# Patient Record
Sex: FEMALE | Race: WHITE | NOT HISPANIC OR LATINO | URBAN - METROPOLITAN AREA
[De-identification: names, ages, dates, MRNs, and addresses within clinical notes are randomized per-mention and may not be internally consistent; named-entity substitution may affect disease eponyms.]

---

## 2017-05-12 ENCOUNTER — HOSPITAL ENCOUNTER (INPATIENT)
Facility: MEDICAL CENTER | Age: 32
LOS: 11 days | DRG: 918 | End: 2017-05-24
Attending: EMERGENCY MEDICINE | Admitting: INTERNAL MEDICINE
Payer: COMMERCIAL

## 2017-05-12 DIAGNOSIS — T50.902A DRUG OVERDOSE, INTENTIONAL SELF-HARM, INITIAL ENCOUNTER (HCC): ICD-10-CM

## 2017-05-12 DIAGNOSIS — R45.851 SUICIDAL IDEATION: ICD-10-CM

## 2017-05-12 LAB
ALBUMIN SERPL BCP-MCNC: 4.2 G/DL (ref 3.2–4.9)
ALBUMIN/GLOB SERPL: 2 G/DL
ALP SERPL-CCNC: 44 U/L (ref 30–99)
ALT SERPL-CCNC: 7 U/L (ref 2–50)
AMPHET UR QL SCN: NEGATIVE
ANION GAP SERPL CALC-SCNC: 8 MMOL/L (ref 0–11.9)
APAP SERPL-MCNC: <10 UG/ML (ref 10–30)
AST SERPL-CCNC: 11 U/L (ref 12–45)
BARBITURATES UR QL SCN: NEGATIVE
BASOPHILS # BLD AUTO: 0.4 % (ref 0–1.8)
BASOPHILS # BLD: 0.03 K/UL (ref 0–0.12)
BENZODIAZ UR QL SCN: NEGATIVE
BILIRUB SERPL-MCNC: 0.4 MG/DL (ref 0.1–1.5)
BUN SERPL-MCNC: 13 MG/DL (ref 8–22)
BZE UR QL SCN: NEGATIVE
CALCIUM SERPL-MCNC: 9.1 MG/DL (ref 8.5–10.5)
CANNABINOIDS UR QL SCN: NEGATIVE
CHLORIDE SERPL-SCNC: 107 MMOL/L (ref 96–112)
CO2 SERPL-SCNC: 23 MMOL/L (ref 20–33)
CREAT SERPL-MCNC: 0.73 MG/DL (ref 0.5–1.4)
EOSINOPHIL # BLD AUTO: 0.1 K/UL (ref 0–0.51)
EOSINOPHIL NFR BLD: 1.3 % (ref 0–6.9)
ERYTHROCYTE [DISTWIDTH] IN BLOOD BY AUTOMATED COUNT: 42.9 FL (ref 35.9–50)
ETHANOL BLD-MCNC: 0 G/DL
GFR SERPL CREATININE-BSD FRML MDRD: >60 ML/MIN/1.73 M 2
GLOBULIN SER CALC-MCNC: 2.1 G/DL (ref 1.9–3.5)
GLUCOSE SERPL-MCNC: 89 MG/DL (ref 65–99)
HCT VFR BLD AUTO: 36.5 % (ref 37–47)
HGB BLD-MCNC: 12.1 G/DL (ref 12–16)
IMM GRANULOCYTES # BLD AUTO: 0.02 K/UL (ref 0–0.11)
IMM GRANULOCYTES NFR BLD AUTO: 0.3 % (ref 0–0.9)
LYMPHOCYTES # BLD AUTO: 1.9 K/UL (ref 1–4.8)
LYMPHOCYTES NFR BLD: 25.2 % (ref 22–41)
MCH RBC QN AUTO: 31 PG (ref 27–33)
MCHC RBC AUTO-ENTMCNC: 33.2 G/DL (ref 33.6–35)
MCV RBC AUTO: 93.6 FL (ref 81.4–97.8)
MDMA UR QL SCN: NEGATIVE
METHADONE UR QL SCN: NEGATIVE
MONOCYTES # BLD AUTO: 0.7 K/UL (ref 0–0.85)
MONOCYTES NFR BLD AUTO: 9.3 % (ref 0–13.4)
NEUTROPHILS # BLD AUTO: 4.79 K/UL (ref 2–7.15)
NEUTROPHILS NFR BLD: 63.5 % (ref 44–72)
NRBC # BLD AUTO: 0 K/UL
NRBC BLD AUTO-RTO: 0 /100 WBC
OPIATES UR QL SCN: NEGATIVE
OXYCODONE UR QL SCN: NEGATIVE
PCP UR QL SCN: NEGATIVE
PLATELET # BLD AUTO: 178 K/UL (ref 164–446)
PMV BLD AUTO: 12.6 FL (ref 9–12.9)
POC BREATHALIZER: 0 PERCENT (ref 0–0.01)
POC BREATHALIZER: 0 PERCENT (ref 0–0.01)
POTASSIUM SERPL-SCNC: 3.3 MMOL/L (ref 3.6–5.5)
PROPOXYPH UR QL SCN: NEGATIVE
PROT SERPL-MCNC: 6.3 G/DL (ref 6–8.2)
RBC # BLD AUTO: 3.9 M/UL (ref 4.2–5.4)
SALICYLATES SERPL-MCNC: 0 MG/DL (ref 15–25)
SODIUM SERPL-SCNC: 138 MMOL/L (ref 135–145)
WBC # BLD AUTO: 7.5 K/UL (ref 4.8–10.8)

## 2017-05-12 PROCEDURE — 83735 ASSAY OF MAGNESIUM: CPT

## 2017-05-12 PROCEDURE — 93005 ELECTROCARDIOGRAM TRACING: CPT | Performed by: EMERGENCY MEDICINE

## 2017-05-12 PROCEDURE — 80053 COMPREHEN METABOLIC PANEL: CPT

## 2017-05-12 PROCEDURE — 84100 ASSAY OF PHOSPHORUS: CPT

## 2017-05-12 PROCEDURE — 84703 CHORIONIC GONADOTROPIN ASSAY: CPT

## 2017-05-12 PROCEDURE — 302970 POC BREATHALIZER: Performed by: EMERGENCY MEDICINE

## 2017-05-12 PROCEDURE — 99285 EMERGENCY DEPT VISIT HI MDM: CPT

## 2017-05-12 PROCEDURE — 36415 COLL VENOUS BLD VENIPUNCTURE: CPT

## 2017-05-12 PROCEDURE — 302970 POC BREATHALIZER

## 2017-05-12 PROCEDURE — 85025 COMPLETE CBC W/AUTO DIFF WBC: CPT

## 2017-05-12 PROCEDURE — 96361 HYDRATE IV INFUSION ADD-ON: CPT

## 2017-05-12 PROCEDURE — 80307 DRUG TEST PRSMV CHEM ANLYZR: CPT

## 2017-05-12 PROCEDURE — 700105 HCHG RX REV CODE 258: Performed by: EMERGENCY MEDICINE

## 2017-05-12 RX ORDER — SODIUM CHLORIDE 9 MG/ML
1000 INJECTION, SOLUTION INTRAVENOUS ONCE
Status: COMPLETED | OUTPATIENT
Start: 2017-05-12 | End: 2017-05-13

## 2017-05-12 RX ADMIN — SODIUM CHLORIDE 1000 ML: 9 INJECTION, SOLUTION INTRAVENOUS at 23:28

## 2017-05-12 ASSESSMENT — LIFESTYLE VARIABLES: DO YOU DRINK ALCOHOL: NO

## 2017-05-12 ASSESSMENT — PAIN SCALES - GENERAL: PAINLEVEL_OUTOF10: 0

## 2017-05-12 NOTE — IP AVS SNAPSHOT
" <p align=\"LEFT\"><IMG SRC=\"//[email or web address]/blob$/[email or web address]\" alt=\"Image\" WIDTH=\"50%\" HEIGHT=\"200\" BORDER=\"\"></p>                   Name:Miranda Sykes  Medical Record Number:[id number]  CSN: [id number]    YOB: 1985   Age: 32 y.o.  Sex: female  HT:1.549 m (5' 1\") WT: 56.8 kg (125 lb 3.5 oz)          Admit Date: 5/12/2017     Discharge Date:   Today's Date: 5/24/2017  Attending Doctor:  [facility] [place]*                  Allergies:  Review of patient's allergies indicates no known allergies.          Follow-up Information     1. Follow up with [facility] ([facility]) .    Specialties:  Psychiatry, Behavioral Health    Contact information    [address] [place] [id number] [phone]         Medication List      Take these Medications        Instructions    lamotrigine 200 MG tablet   What changed:    - medication strength  - how much to take   Commonly known as:  LAMICTAL    Take 1 Tab by mouth every day.   Dose:  200 mg       ziprasidone 40 MG Caps   Commonly known as:  GEODON    Take 1 Cap by mouth 2 Times a Day.   Dose:  40 mg         "

## 2017-05-12 NOTE — IP AVS SNAPSHOT
[email or web address] Access Code: [email or web address]  Expires: 6/23/2017  5:37 PM    Your email address is not on file at [facility].  Email Addresses are required for you to sign up for [email or web address], please contact [phone] to verify your personal information and to provide your email address prior to attempting to register for [email or web address].    Miranda Sykes  [address] [place], [place] [id number]    [email or web address]  A secure, online tool to manage your health information     [facility]’s liveMag.ro® is a secure, online tool that connects you to your personalized health information from the privacy of your home -- day or night - making it very easy for you to manage your healthcare. Once the activation process is completed, you can even access your medical information using the [email or web address].ro emmanuel, which is available for free in the Apple Emmanuel store or Google Play store.     To learn more about [email or web address], visit [email or web address]    There are two levels of access available (as shown below):   My Chart Features  [facility] Primary Care Doctor [facility]  Specialists [facility]  Urgent  Care Non-[facility] Primary Care Doctor   Email your healthcare team securely and privately 24/7 X X X    Manage appointments: schedule your next appointment; view details of past/upcoming appointments X      Request prescription refills. X      View recent personal medical records, including lab and immunizations X X X X   View health record, including health history, allergies, medications X X X X   Read reports about your outpatient visits, procedures, consult and ER notes X X X X   See your discharge summary, which is a recap of your hospital and/or ER visit that includes your diagnosis, lab results, and care plan X X  X     How to register for [email or web address]:  Once your e-mail address has been verified, follow the following steps to sign up for [email or web address].     1. Go to  [email or web address]  2. Click on the Sign Up Now box, which takes you to the New Member Sign Up page. You  will need to provide the following information:  a. Enter your [employer] Access Code exactly as it appears at the top of this page. (You will not need to use this code after you’ve completed the sign-up process. If you do not sign up before the expiration date, you must request a new code.)   b. Enter your date of birth.   c. Enter your home email address.   d. Click Submit, and follow the next screen’s instructions.  3. Create a Kuaidi Dachet ID. This will be your [employer] login ID and cannot be changed, so think of one that is secure and easy to remember.  4. Create a [employer] password. You can change your password at any time.  5. Enter your Password Reset Question and Answer. This can be used at a later time if you forget your password.   6. Enter your e-mail address. This allows you to receive e-mail notifications when new information is available in [employer].  7. Click Sign Up. You can now view your health information.    For assistance activating your [employer] account, call [phone]

## 2017-05-12 NOTE — IP AVS SNAPSHOT
" Home Care Instructions                                                                                                                  Name:Miranda Sykes  Medical Record Number:[id number]  CSN: [id number]    YOB: 1985   Age: 32 y.o.  Sex: female  HT:1.549 m (5' 1\") WT: 56.8 kg (125 lb 3.5 oz)          Admit Date: 5/12/2017     Discharge Date:   Today's Date: 5/24/2017  Attending Doctor:  [facility] [place]*                  Allergies:  Review of patient's allergies indicates no known allergies.            Discharge Instructions       Discharge Instructions    Discharged to other by ambulance with escort. Discharged via ambulance, hospital escort: Yes.  Special equipment needed: Not Applicable    Be sure to schedule a follow-up appointment with your primary care doctor or any specialists as instructed.     Discharge Plan:   Smoking Cessation Offered: Patient Counseled  Pneumococcal Vaccine Given - only chart on this line when given: Given (See MAR)  Influenza Vaccine Indication: Not indicated: Previously immunized this influenza season and > 8 years of age    I understand that a diet low in cholesterol, fat, and sodium is recommended for good health. Unless I have been given specific instructions below for another diet, I accept this instruction as my diet prescription.   Other diet: Regular      Special Instructions: None    · Is patient discharged on Warfarin / Coumadin?   No     · Is patient Post Blood Transfusion?  No  Suicidal Feelings: How to Help Yourself  Suicide is the taking of one's own life. If you feel as though life is getting too tough to handle and are thinking about suicide, get help right away. To get help:  · Call your local emergency services (911 in the [place]  · Call a suicide hotline to speak with a trained counselor who understands how you are feeling. The following is a list of suicide hotlines in the [place]. For a list of hotlines in [place], visit " www.suicide.org/hotlines/international/homxne-ganeacr-dfwvkiua.html.  ¨  5-251-280-TALK (1-800.332.9736).  ¨  2-712-LALCTTI (1-805.259.6783).  ¨  1-632.498.3280. This is a hotline for [place] speakers.  ¨  7-885-773-4TTY (1-257.645.6787). This is a hotline for TTY users.  ¨  3-470-3-U-SANTIAGO (1-332.614.4351). This is a hotline for lesbian, ortega, bisexual, transgender, or questioning youth.  · Contact a crisis center or a local suicide prevention center. To find a crisis center or suicide prevention center:  ¨ Call your local hospital, clinic, community service organization, mental health center, social service provider, or health department. Ask for assistance in connecting to a crisis center.  ¨ Visit www.suicidepreventionlifeline.org/getinvolved/ for a list of crisis centers in the [place], or visit www.suicideprevention.ca/xvjehvar-iujhb-immjswk/find-a-crisis-centre for a list of centers in [place].  · Visit the following websites:  ¨  National Suicide Prevention Lifeline: www.suicidepreventionlifeline.org  ¨  Hopeline: www.hopeline.com  ¨  American Foundation for Suicide Prevention: www.afsp.org  ¨  The [place] Project (for lesbian, ortega, bisexual, transgender, or questioning youth): www.thetrevorproject.org  HOW CAN I HELP MYSELF FEEL BETTER?  · Promise yourself that you will not do anything drastic when you have suicidal feelings. Remember, there is hope. Many people have gotten through suicidal thoughts and feelings, and you will, too. You may have gotten through them before, and this proves that you can get through them again.  · Let family, friends, teachers, or counselors know how you are feeling. Try not to isolate yourself from those who care about you. Remember, they will want to help you. Talk with someone every day, even if you do not feel sociable. Face-to-face conversation is best.  · Call a mental health professional and see one regularly.  · Visit your primary health care provider every  year.  · Eat a well-balanced diet, and space your meals so you eat regularly.  · Get plenty of rest.  · Avoid alcohol and drugs, and remove them from your home. They will only make you feel worse.  · If you are thinking of taking a lot of medicine, give your medicine to someone who can give it to you one day at a time. If you are on antidepressants and are concerned you will overdose, let your health care provider know so he or she can give you safer medicines. Ask your mental health professional about the possible side effects of any medicines you are taking.  · Remove weapons, poisons, knives, and anything else that could harm you from your home.  · Try to stick to routines. Follow a schedule every day. Put self-care on your schedule.  · Make a list of realistic goals, and cross them off when you achieve them. Accomplishments give a sense of worth.  · Wait until you are feeling better before doing the things you find difficult or unpleasant.  · Exercise if you are able. You will feel better if you exercise for even a half hour each day.  · Go out in the sun or into nature. This will help you recover from depression faster. If you have a favorite place to walk, go there.  · Do the things that have always given you pleasure. Play your favorite music, read a good book, paint a picture, play your favorite instrument, or do anything else that takes your mind off your depression if it is safe to do.  · Keep your living space well lit.  · When you are feeling well, write yourself a letter about tips and support that you can read when you are not feeling well.  · Remember that life's difficulties can be sorted out with help. Conditions can be treated. You can work on thoughts and strategies that serve you well.     This information is not intended to replace advice given to you by your health care provider. Make sure you discuss any questions you have with your health care provider.     Document Released: 06/23/2004  Document Revised: 01/08/2016 Document Reviewed: 04/14/2015  Cloudwords Interactive Patient Education ©2016 [employer]      Depression / Suicide Risk    As you are discharged from this [facility] facility, it is important to learn how to keep safe from harming yourself.    Recognize the warning signs:  · Abrupt changes in personality, positive or negative- including increase in energy   · Giving away possessions  · Change in eating patterns- significant weight changes-  positive or negative  · Change in sleeping patterns- unable to sleep or sleeping all the time   · Unwillingness or inability to communicate  · Depression  · Unusual sadness, discouragement and loneliness  · Talk of wanting to die  · Neglect of personal appearance   · Rebelliousness- reckless behavior  · Withdrawal from people/activities they love  · Confusion- inability to concentrate     If you or a loved one observes any of these behaviors or has concerns about self-harm, here's what you can do:  · Talk about it- your feelings and reasons for harming yourself  · Remove any means that you might use to hurt yourself (examples: pills, rope, extension cords, firearm)  · Get professional help from the community (Mental Health, Substance Abuse, psychological counseling)  · Do not be alone:Call your Safe Contact- someone whom you trust who will be there for you.  · Call your local CRISIS HOTLINE 983-6761 or 357-240-0812  · Call your local Children's Mobile Crisis Response Team [place] (435) 421-0218 or www.thesixtyone  · Call the toll free National Suicide Prevention Hotlines   · National Suicide Prevention Lifeline 349-309-BCHB (2344)  · National Hope Line Network 800-SUICIDE (358-7501)        Follow-up Information     1. Follow up with [facility] - [facility] ([facility]) .    Specialties:  Psychiatry, Behavioral Health    Contact information    [address] [id number] [phone]         Discharge Medication  Instructions:    Below are the medications your physician expects you to take upon discharge:    Review all your home medications and newly ordered medications with your doctor and/or pharmacist. Follow medication instructions as directed by your doctor and/or pharmacist.    Please keep your medication list with you and share with your physician.               Medication List      START taking these medications        Instructions    Morning Afternoon Evening Bedtime    ziprasidone 40 MG Caps   Last time this was given:  40 mg on 5/24/2017  8:40 AM   Commonly known as:  GEODON        Take 1 Cap by mouth 2 Times a Day.   Dose:  40 mg                          CHANGE how you take these medications        Instructions    Morning Afternoon Evening Bedtime    lamotrigine 200 MG tablet   What changed:    - medication strength  - how much to take   Last time this was given:  200 mg on 5/24/2017  8:40 AM   Commonly known as:  LAMICTAL        Take 1 Tab by mouth every day.   Dose:  200 mg                          STOP taking these medications     risperidone 3 MG Tabs   Commonly known as:  RISPERDAL                    Where to Get Your Medications      Information about where to get these medications is not yet available     ! Ask your nurse or doctor about these medications    - lamotrigine 200 MG tablet  - ziprasidone 40 MG Caps            Instructions           Diet / Nutrition:    Follow any diet instructions given to you by your doctor or the dietician, including how much salt (sodium) you are allowed each day.    If you are overweight, talk to your doctor about a weight reduction plan.    Activity:    Remain physically active following your doctor's instructions about exercise and activity.    Rest often.     Any time you become even a little tired or short of breath, SIT DOWN and rest.    Worsening Symptoms:    Report any of the following signs and symptoms to the doctor's office immediately:    *Pain of jaw, arm, or  neck  *Chest pain not relieved by medication                               *Dizziness or loss of consciousness  *Difficulty breathing even when at rest   *More tired than usual                                       *Bleeding drainage or swelling of surgical site  *Swelling of feet, ankles, legs or stomach                 *Fever (>100ºF)  *Pink or blood tinged sputum  *Weight gain (3lbs/day or 5lbs /week)           *Shock from internal defibrillator (if applicable)  *Palpitations or irregular heartbeats                *Cool and/or numb extremities    Stroke Awareness    Common Risk Factors for Stroke include:    Age  Atrial Fibrillation  Carotid Artery Stenosis  Diabetes Mellitus  Excessive alcohol consumption  High blood pressure  Overweight   Physical inactivity  Smoking    Warning signs and symptoms of a stroke include:    *Sudden numbness or weakness of the face, arm or leg (especially on one side of the body).  *Sudden confusion, trouble speaking or understanding.  *Sudden trouble seeing in one or both eyes.  *Sudden trouble walking, dizziness, loss of balance or coordination.Sudden severe headache with no known cause.    It is very important to get treatment quickly when a stroke occurs. If you experience any of the above warning signs, call 911 immediately.                   Disclaimer         Quit Smoking / Tobacco Use:    I understand the use of any tobacco products increases my chance of suffering from future heart disease or stroke and could cause other illnesses which may shorten my life. Quitting the use of tobacco products is the single most important thing I can do to improve my health. For further information on smoking / tobacco cessation call a Toll Free Quit Line at 1-522.772.9254 (*National Cancer [place]) or 1-500.809.8211 (American Lung Association) or you can access the web based program at www.lungusa.org.    [place] Tobacco Users Help Line:  (243) 526-2013       Toll Free:  5-474-854-8540  Quit Tobacco Program [facility] (329)089-0608    Crisis Hotline:    National Crisis Hotline:  6-453-DKHOXYN or 1-305.725.7582    [place] Crisis Hotline:    1-549.585.8568 or 475-992-7898    Discharge Survey:   Thank you for choosing [facility]. We hope we did everything we could to make your hospital stay a pleasant one. You may be receiving a phone survey and we would appreciate your time and participation in answering the questions. Your input is very valuable to us in our efforts to improve our service to our patients and their families.        My signature on this form indicates that:    1. I have reviewed and understand the above information.  2. My questions regarding this information have been answered to my satisfaction.  3. I have formulated a plan with my discharge nurse to obtain my prescribed medications for home.                  Disclaimer         __________________________________                     __________       ________                       Patient Signature                                                 Date                    Time

## 2017-05-12 NOTE — IP AVS SNAPSHOT
5/24/2017    Miranda Sykes  [address] [address]    Dear Miranda:    Psychiatric hospital wants to ensure your discharge home is safe and you or your loved ones have had all of your questions answered regarding your care after you leave the hospital.    Below is a list of resources and contact information should you have any questions regarding your hospital stay, follow-up instructions, or active medical symptoms.    Questions or Concerns Regarding… Contact   Medical Questions Related to Your Discharge  (7 days a week, 8am-5pm) Contact a Nurse Care Coordinator   [phone]   Medical Questions Not Related to Your Discharge  (24 hours a day / 7 days a week)  Contact the Nurse Health Line   [phone]    Medications or Discharge Instructions Refer to your discharge packet   or contact your [facility] Primary Care Provider   [phone]   Follow-up Appointment(s) Schedule your appointment via [facility]   or contact Scheduling [phone]   Billing Review your statement via [employer]  or contact Billing [phone]   Medical Records Review your records via Sansan   or contact Medical Records [phone]     You may receive a telephone call within two days of discharge. This call is to make certain you understand your discharge instructions and have the opportunity to have any questions answered. You can also easily access your medical information, test results and upcoming appointments via the [employer] free online health management tool. You can learn more and sign up at [email or web address]. For assistance setting up your [employer] account, please call [phone].    Once again, we want to ensure your discharge home is safe and that you have a clear understanding of any next steps in your care. If you have any questions or concerns, please do not hesitate to contact us, we are here for you. Thank you for choosing [facility] for your healthcare needs.    Sincerely,    Your [facility] Healthcare Team

## 2017-05-13 ENCOUNTER — RESOLUTE PROFESSIONAL BILLING HOSPITAL PROF FEE (OUTPATIENT)
Dept: OTHER | Facility: MEDICAL CENTER | Age: 32
End: 2017-05-13
Payer: COMMERCIAL

## 2017-05-13 ENCOUNTER — APPOINTMENT (OUTPATIENT)
Dept: RADIOLOGY | Facility: MEDICAL CENTER | Age: 32
DRG: 918 | End: 2017-05-13
Attending: HOSPITALIST
Payer: COMMERCIAL

## 2017-05-13 PROBLEM — T50.902A DRUG OVERDOSE, INTENTIONAL (HCC): Status: ACTIVE | Noted: 2017-05-13

## 2017-05-13 PROBLEM — I95.9 HYPOTENSION: Status: ACTIVE | Noted: 2017-05-13

## 2017-05-13 LAB
ALBUMIN SERPL BCP-MCNC: 3.3 G/DL (ref 3.2–4.9)
ALBUMIN/GLOB SERPL: 1.8 G/DL
ALP SERPL-CCNC: 35 U/L (ref 30–99)
ALT SERPL-CCNC: 6 U/L (ref 2–50)
ANION GAP SERPL CALC-SCNC: 4 MMOL/L (ref 0–11.9)
ANION GAP SERPL CALC-SCNC: 6 MMOL/L (ref 0–11.9)
AST SERPL-CCNC: 10 U/L (ref 12–45)
BASOPHILS # BLD AUTO: 0.3 % (ref 0–1.8)
BASOPHILS # BLD: 0.02 K/UL (ref 0–0.12)
BILIRUB SERPL-MCNC: 0.4 MG/DL (ref 0.1–1.5)
BUN SERPL-MCNC: 8 MG/DL (ref 8–22)
BUN SERPL-MCNC: 8 MG/DL (ref 8–22)
CALCIUM SERPL-MCNC: 8.2 MG/DL (ref 8.5–10.5)
CALCIUM SERPL-MCNC: 8.7 MG/DL (ref 8.5–10.5)
CHLORIDE SERPL-SCNC: 113 MMOL/L (ref 96–112)
CHLORIDE SERPL-SCNC: 115 MMOL/L (ref 96–112)
CO2 SERPL-SCNC: 21 MMOL/L (ref 20–33)
CO2 SERPL-SCNC: 24 MMOL/L (ref 20–33)
CREAT SERPL-MCNC: 0.55 MG/DL (ref 0.5–1.4)
CREAT SERPL-MCNC: 0.71 MG/DL (ref 0.5–1.4)
EKG IMPRESSION: NORMAL
EKG IMPRESSION: NORMAL
EOSINOPHIL # BLD AUTO: 0.09 K/UL (ref 0–0.51)
EOSINOPHIL NFR BLD: 1.1 % (ref 0–6.9)
ERYTHROCYTE [DISTWIDTH] IN BLOOD BY AUTOMATED COUNT: 43.6 FL (ref 35.9–50)
GFR SERPL CREATININE-BSD FRML MDRD: >60 ML/MIN/1.73 M 2
GFR SERPL CREATININE-BSD FRML MDRD: >60 ML/MIN/1.73 M 2
GLOBULIN SER CALC-MCNC: 1.8 G/DL (ref 1.9–3.5)
GLUCOSE SERPL-MCNC: 127 MG/DL (ref 65–99)
GLUCOSE SERPL-MCNC: 92 MG/DL (ref 65–99)
HCG SERPL QL: NEGATIVE
HCT VFR BLD AUTO: 32.9 % (ref 37–47)
HGB BLD-MCNC: 10.8 G/DL (ref 12–16)
IMM GRANULOCYTES # BLD AUTO: 0.02 K/UL (ref 0–0.11)
IMM GRANULOCYTES NFR BLD AUTO: 0.3 % (ref 0–0.9)
LYMPHOCYTES # BLD AUTO: 1.92 K/UL (ref 1–4.8)
LYMPHOCYTES NFR BLD: 24.5 % (ref 22–41)
MAGNESIUM SERPL-MCNC: 1.8 MG/DL (ref 1.5–2.5)
MAGNESIUM SERPL-MCNC: 1.9 MG/DL (ref 1.5–2.5)
MCH RBC QN AUTO: 31 PG (ref 27–33)
MCHC RBC AUTO-ENTMCNC: 32.8 G/DL (ref 33.6–35)
MCV RBC AUTO: 94.5 FL (ref 81.4–97.8)
MONOCYTES # BLD AUTO: 0.53 K/UL (ref 0–0.85)
MONOCYTES NFR BLD AUTO: 6.8 % (ref 0–13.4)
NEUTROPHILS # BLD AUTO: 5.26 K/UL (ref 2–7.15)
NEUTROPHILS NFR BLD: 67 % (ref 44–72)
NRBC # BLD AUTO: 0 K/UL
NRBC BLD AUTO-RTO: 0 /100 WBC
PHOSPHATE SERPL-MCNC: 3.1 MG/DL (ref 2.5–4.5)
PHOSPHATE SERPL-MCNC: 3.3 MG/DL (ref 2.5–4.5)
PLATELET # BLD AUTO: 148 K/UL (ref 164–446)
PMV BLD AUTO: 12.3 FL (ref 9–12.9)
POTASSIUM SERPL-SCNC: 3.9 MMOL/L (ref 3.6–5.5)
POTASSIUM SERPL-SCNC: 4.7 MMOL/L (ref 3.6–5.5)
PROT SERPL-MCNC: 5.1 G/DL (ref 6–8.2)
RBC # BLD AUTO: 3.48 M/UL (ref 4.2–5.4)
SODIUM SERPL-SCNC: 140 MMOL/L (ref 135–145)
SODIUM SERPL-SCNC: 143 MMOL/L (ref 135–145)
WBC # BLD AUTO: 7.8 K/UL (ref 4.8–10.8)

## 2017-05-13 PROCEDURE — 80053 COMPREHEN METABOLIC PANEL: CPT

## 2017-05-13 PROCEDURE — 83735 ASSAY OF MAGNESIUM: CPT

## 2017-05-13 PROCEDURE — 96372 THER/PROPH/DIAG INJ SC/IM: CPT

## 2017-05-13 PROCEDURE — 84100 ASSAY OF PHOSPHORUS: CPT

## 2017-05-13 PROCEDURE — 700111 HCHG RX REV CODE 636 W/ 250 OVERRIDE (IP): Performed by: STUDENT IN AN ORGANIZED HEALTH CARE EDUCATION/TRAINING PROGRAM

## 2017-05-13 PROCEDURE — 700111 HCHG RX REV CODE 636 W/ 250 OVERRIDE (IP): Performed by: INTERNAL MEDICINE

## 2017-05-13 PROCEDURE — 700101 HCHG RX REV CODE 250: Performed by: STUDENT IN AN ORGANIZED HEALTH CARE EDUCATION/TRAINING PROGRAM

## 2017-05-13 PROCEDURE — 93005 ELECTROCARDIOGRAM TRACING: CPT | Performed by: HOSPITALIST

## 2017-05-13 PROCEDURE — 96361 HYDRATE IV INFUSION ADD-ON: CPT

## 2017-05-13 PROCEDURE — 36415 COLL VENOUS BLD VENIPUNCTURE: CPT

## 2017-05-13 PROCEDURE — 85025 COMPLETE CBC W/AUTO DIFF WBC: CPT

## 2017-05-13 PROCEDURE — 96366 THER/PROPH/DIAG IV INF ADDON: CPT

## 2017-05-13 PROCEDURE — 700102 HCHG RX REV CODE 250 W/ 637 OVERRIDE(OP): Performed by: STUDENT IN AN ORGANIZED HEALTH CARE EDUCATION/TRAINING PROGRAM

## 2017-05-13 PROCEDURE — A9270 NON-COVERED ITEM OR SERVICE: HCPCS | Performed by: STUDENT IN AN ORGANIZED HEALTH CARE EDUCATION/TRAINING PROGRAM

## 2017-05-13 PROCEDURE — 700105 HCHG RX REV CODE 258: Performed by: HOSPITALIST

## 2017-05-13 PROCEDURE — 99291 CRITICAL CARE FIRST HOUR: CPT | Mod: GC | Performed by: INTERNAL MEDICINE

## 2017-05-13 PROCEDURE — 96365 THER/PROPH/DIAG IV INF INIT: CPT

## 2017-05-13 PROCEDURE — 700102 HCHG RX REV CODE 250 W/ 637 OVERRIDE(OP): Performed by: PSYCHIATRY & NEUROLOGY

## 2017-05-13 PROCEDURE — A9270 NON-COVERED ITEM OR SERVICE: HCPCS | Performed by: PSYCHIATRY & NEUROLOGY

## 2017-05-13 PROCEDURE — 96367 TX/PROPH/DG ADDL SEQ IV INF: CPT

## 2017-05-13 PROCEDURE — 80048 BASIC METABOLIC PNL TOTAL CA: CPT

## 2017-05-13 PROCEDURE — 770006 HCHG ROOM/CARE - MED/SURG/GYN SEMI*

## 2017-05-13 PROCEDURE — 700105 HCHG RX REV CODE 258: Performed by: INTERNAL MEDICINE

## 2017-05-13 PROCEDURE — 93005 ELECTROCARDIOGRAM TRACING: CPT | Performed by: STUDENT IN AN ORGANIZED HEALTH CARE EDUCATION/TRAINING PROGRAM

## 2017-05-13 PROCEDURE — 71010 DX-CHEST-PORTABLE (1 VIEW): CPT

## 2017-05-13 RX ORDER — LAMOTRIGINE 100 MG/1
200 TABLET ORAL DAILY
Status: DISCONTINUED | OUTPATIENT
Start: 2017-05-13 | End: 2017-05-24 | Stop reason: HOSPADM

## 2017-05-13 RX ORDER — MAGNESIUM SULFATE HEPTAHYDRATE 40 MG/ML
2 INJECTION, SOLUTION INTRAVENOUS ONCE
Status: COMPLETED | OUTPATIENT
Start: 2017-05-13 | End: 2017-05-13

## 2017-05-13 RX ORDER — POLYETHYLENE GLYCOL 3350 17 G/17G
1 POWDER, FOR SOLUTION ORAL
Status: DISCONTINUED | OUTPATIENT
Start: 2017-05-13 | End: 2017-05-24

## 2017-05-13 RX ORDER — LAMOTRIGINE 100 MG/1
200 TABLET ORAL DAILY
Status: DISCONTINUED | OUTPATIENT
Start: 2017-05-13 | End: 2017-05-13

## 2017-05-13 RX ORDER — LAMOTRIGINE 25 MG/1
25 TABLET ORAL DAILY
Status: ON HOLD | COMMUNITY
End: 2017-05-24

## 2017-05-13 RX ORDER — ENALAPRILAT 1.25 MG/ML
1.25 INJECTION INTRAVENOUS EVERY 6 HOURS PRN
Status: DISCONTINUED | OUTPATIENT
Start: 2017-05-13 | End: 2017-05-13

## 2017-05-13 RX ORDER — LORAZEPAM 1 MG/1
1 TABLET ORAL
Status: DISCONTINUED | OUTPATIENT
Start: 2017-05-13 | End: 2017-05-13

## 2017-05-13 RX ORDER — SODIUM CHLORIDE AND POTASSIUM CHLORIDE 300; 900 MG/100ML; MG/100ML
INJECTION, SOLUTION INTRAVENOUS CONTINUOUS
Status: DISCONTINUED | OUTPATIENT
Start: 2017-05-13 | End: 2017-05-13

## 2017-05-13 RX ORDER — LAMOTRIGINE 100 MG/1
100 TABLET ORAL ONCE
Status: ACTIVE | OUTPATIENT
Start: 2017-05-13 | End: 2017-05-14

## 2017-05-13 RX ORDER — SODIUM CHLORIDE 9 MG/ML
INJECTION, SOLUTION INTRAVENOUS CONTINUOUS
Status: DISCONTINUED | OUTPATIENT
Start: 2017-05-13 | End: 2017-05-14

## 2017-05-13 RX ORDER — LORAZEPAM 1 MG/1
1 TABLET ORAL EVERY 4 HOURS PRN
Status: DISCONTINUED | OUTPATIENT
Start: 2017-05-13 | End: 2017-05-24 | Stop reason: HOSPADM

## 2017-05-13 RX ORDER — SODIUM CHLORIDE 9 MG/ML
1000 INJECTION, SOLUTION INTRAVENOUS ONCE
Status: COMPLETED | OUTPATIENT
Start: 2017-05-13 | End: 2017-05-13

## 2017-05-13 RX ORDER — ACETAMINOPHEN 325 MG/1
650 TABLET ORAL EVERY 6 HOURS PRN
Status: DISCONTINUED | OUTPATIENT
Start: 2017-05-13 | End: 2017-05-24 | Stop reason: HOSPADM

## 2017-05-13 RX ORDER — RISPERIDONE 3 MG/1
3 TABLET ORAL 2 TIMES DAILY
Status: ON HOLD | COMMUNITY
End: 2017-05-24

## 2017-05-13 RX ORDER — NICOTINE 21 MG/24HR
21 PATCH, TRANSDERMAL 24 HOURS TRANSDERMAL
Status: DISCONTINUED | OUTPATIENT
Start: 2017-05-13 | End: 2017-05-24 | Stop reason: HOSPADM

## 2017-05-13 RX ORDER — AMOXICILLIN 250 MG
2 CAPSULE ORAL 2 TIMES DAILY
Status: DISCONTINUED | OUTPATIENT
Start: 2017-05-13 | End: 2017-05-24 | Stop reason: HOSPADM

## 2017-05-13 RX ORDER — BISACODYL 10 MG
10 SUPPOSITORY, RECTAL RECTAL
Status: DISCONTINUED | OUTPATIENT
Start: 2017-05-13 | End: 2017-05-24

## 2017-05-13 RX ORDER — POTASSIUM CHLORIDE 1.5 G/1.58G
40 POWDER, FOR SOLUTION ORAL ONCE
Status: COMPLETED | OUTPATIENT
Start: 2017-05-13 | End: 2017-05-13

## 2017-05-13 RX ADMIN — MAGNESIUM SULFATE IN WATER 2 G: 40 INJECTION, SOLUTION INTRAVENOUS at 04:17

## 2017-05-13 RX ADMIN — LAMOTRIGINE 200 MG: 100 TABLET ORAL at 15:15

## 2017-05-13 RX ADMIN — SODIUM CHLORIDE: 9 INJECTION, SOLUTION INTRAVENOUS at 18:19

## 2017-05-13 RX ADMIN — SODIUM CHLORIDE AND POTASSIUM CHLORIDE: 9; 2.98 INJECTION, SOLUTION INTRAVENOUS at 14:22

## 2017-05-13 RX ADMIN — ENOXAPARIN SODIUM 40 MG: 100 INJECTION SUBCUTANEOUS at 08:47

## 2017-05-13 RX ADMIN — STANDARDIZED SENNA CONCENTRATE AND DOCUSATE SODIUM 2 TABLET: 8.6; 5 TABLET, FILM COATED ORAL at 08:47

## 2017-05-13 RX ADMIN — SODIUM CHLORIDE 1000 ML: 9 INJECTION, SOLUTION INTRAVENOUS at 02:18

## 2017-05-13 RX ADMIN — POTASSIUM CHLORIDE 40 MEQ: 1.5 POWDER, FOR SOLUTION ORAL at 02:30

## 2017-05-13 RX ADMIN — NICOTINE 21 MG: 21 PATCH, EXTENDED RELEASE TRANSDERMAL at 08:51

## 2017-05-13 RX ADMIN — SODIUM CHLORIDE 1000 ML: 9 INJECTION, SOLUTION INTRAVENOUS at 00:45

## 2017-05-13 ASSESSMENT — PAIN SCALES - GENERAL
PAINLEVEL_OUTOF10: 0

## 2017-05-13 ASSESSMENT — LIFESTYLE VARIABLES
ALCOHOL_USE: NO
SUBSTANCE_ABUSE: 1
EVER_SMOKED: YES

## 2017-05-13 ASSESSMENT — ENCOUNTER SYMPTOMS: PALPITATIONS: 0

## 2017-05-13 NOTE — H&P
"       [facility] Medicine Admitting History and Physical    Name Miranda Sykes       1985   Age/Sex 32 y.o. female   MRN [id number]   Code Status FULL     After 5PM or if no immediate response to page, please call for cross-coverage  Attending/Team: Selwyn Call [phone] to page   1st Call - Day Intern (R1):    2nd Call - Day Sr. Resident (R2/R3):          Chief Complaint:  Drug overdose with suicidal intent    HPI:  This is a 32-year-old female who presents to the ED with drug overdose. She was brought in after being found by her on after ingesting about 26 pills of 3 mg risperidone. She states that she wanted to kill herself because she thought \"the world and ended \" and that she thought that all of her family members have . She is unable to explain why she thought that all of her family members have  stating only that \"[she has] an extensive psych history\". She states that she is seen by a psychiatrist in [place] where she lives, has only been in [place] for 3 days.    Denies current suicidal ideation now that she knows that her family is alive. States after she took the pills she lie down to sleep at some point her aunt found her and called for help. Has attempted to harm herself 5-6 times in the past both with pills and with cutting. Denies any medical problems other than psych.    Denies any medications other than risperidone (though a bottle of lamotrigine accompanied her to EGD). Apparently normally does well on Risperdal; has only been taking sporadically lately; tends to stop taking her medication when she started smoking marijuana. States that she started smoking marijuana and stop taking her medication recently.    Current smoker (one pack per day?), Smoking since age of 19; denies alcohol use; initially denied recreational drug use, later endorsed marijuana use.    Contacted poison control who recommended monitoring electrolytes and optimizing for prevention of arrhythmia, " "monitoring EKG particularly for QTC prolongation (currently already prolonged at 523), IV fluids, protecting airway if becomes necessary, benzos for agitation, and observing for minimum of 6-8 hours. Case number [id number].    Review of Systems   Unable to perform ROS  Eyes:        No vision changes   Cardiovascular: Positive for leg swelling. Negative for chest pain and palpitations.   Psychiatric/Behavioral: Positive for suicidal ideas and substance abuse.        \"extensive psych history\"   Review of systems Limited due to patient mental status          Past Medical History:   No past medical history on file.    Past Surgical History:  No past surgical history on file.    Current Outpatient Medications:  Home Medications     Reviewed by Mckayla Barrios, Pharmacy Int (Pharmacy Intern) on 05/13/17 at 0001  Med List Status: Complete    Medication Last Dose Status    lamotrigine (LAMICTAL) 25 MG Tab 5/12/2017 Active    risperidone (RISPERDAL) 3 MG Tab 5/13/2017 Active              Medication Allergy/Sensitivities:  No Known Allergies    Family History:  No family history on file.    Social History:  Social History     Social History   • Marital Status: Single     Spouse Name: N/A   • Number of Children: N/A   • Years of Education: N/A     Occupational History   • Not on file.     Social History Main Topics   • Smoking status: Not on file   • Smokeless tobacco: Not on file   • Alcohol Use: Not on file   • Drug Use: Not on file   • Sexual Activity: Not on file     Other Topics Concern   • Not on file     Social History Narrative   • No narrative on file     Living situation: Unknown  PCP : Unknown    Physical Exam     Filed Vitals:    05/12/17 2230 05/12/17 2300 05/12/17 2330 05/12/17 2337   BP:       Pulse: 60 68 57 67   Temp:       Resp: 15 30 16 23   Height:       Weight:       SpO2: 98% 95% 96% 96%     Body mass index is 25.52 kg/(m^2).  BP 96/51 mmHg  Pulse 67  Temp(Src) 36.6 °C (97.9 °F)  Resp 23  Ht 1.549 m (5' " "1\")  Wt 61.236 kg (135 lb)  BMI 25.52 kg/m2  SpO2 96%  O2 therapy: Pulse Oximetry: 96 %, O2 Delivery: None (Room Air)    Physical Exam   Constitutional: She is oriented to person, place, and time and well-developed, well-nourished, and in no distress. No distress.   Very sleepy but arousable   HENT:   Head: Normocephalic and atraumatic.   Nose: Nose normal.   Mouth/Throat: Oropharynx is clear and moist.   Eyes: Conjunctivae are normal. Pupils are equal, round, and reactive to light. No scleral icterus.   Neck: Normal range of motion. No JVD present. No tracheal deviation present. No thyromegaly present.   Cardiovascular: Normal rate, regular rhythm and normal heart sounds.    Pulmonary/Chest: Effort normal and breath sounds normal. No respiratory distress. She has no wheezes. She has no rales.   Abdominal: Soft. Bowel sounds are normal. She exhibits no distension and no mass. There is no tenderness. There is no guarding.   Musculoskeletal: She exhibits edema. She exhibits no tenderness.   Bilateral lower extremity 1+ nonpitting edema   Lymphadenopathy:     She has no cervical adenopathy.   Neurological: She is oriented to person, place, and time. No cranial nerve deficit. She exhibits normal muscle tone. GCS score is 15.   Sleepy but arousable  Gait not tested  No gross focal sensory or motor deficit   Skin: Skin is warm and dry. No rash noted. She is not diaphoretic. No erythema.       Data Review       Old Records Request:   Deferred  Current Records review and summary: Completed    Lab Data Review:  Recent Results (from the past 24 hour(s))   URINE DRUG SCREEN (TRIAGE)    Collection Time: 05/12/17 10:35 PM   Result Value Ref Range    Amphetamines Urine Negative Negative    Barbiturates Negative Negative    Benzodiazepines Negative Negative    Cocaine Metabolite Negative Negative    Methadone Negative Negative    Ecstasy Negative Negative    Opiates Negative Negative    Oxycodone Negative Negative    " Phencyclidine -Pcp Negative Negative    Propoxyphene Negative Negative    Cannabinoid Metab Negative Negative   CBC WITH DIFFERENTIAL    Collection Time: 05/12/17 10:35 PM   Result Value Ref Range    WBC 7.5 4.8 - 10.8 K/uL    RBC 3.90 (L) 4.20 - 5.40 M/uL    Hemoglobin 12.1 12.0 - 16.0 g/dL    Hematocrit 36.5 (L) 37.0 - 47.0 %    MCV 93.6 81.4 - 97.8 fL    MCH 31.0 27.0 - 33.0 pg    MCHC 33.2 (L) 33.6 - 35.0 g/dL    RDW 42.9 35.9 - 50.0 fL    Platelet Count 178 164 - 446 K/uL    MPV 12.6 9.0 - 12.9 fL    Neutrophils-Polys 63.50 44.00 - 72.00 %    Lymphocytes 25.20 22.00 - 41.00 %    Monocytes 9.30 0.00 - 13.40 %    Eosinophils 1.30 0.00 - 6.90 %    Basophils 0.40 0.00 - 1.80 %    Immature Granulocytes 0.30 0.00 - 0.90 %    Nucleated RBC 0.00 /100 WBC    Neutrophils (Absolute) 4.79 2.00 - 7.15 K/uL    Lymphs (Absolute) 1.90 1.00 - 4.80 K/uL    Monos (Absolute) 0.70 0.00 - 0.85 K/uL    Eos (Absolute) 0.10 0.00 - 0.51 K/uL    Baso (Absolute) 0.03 0.00 - 0.12 K/uL    Immature Granulocytes (abs) 0.02 0.00 - 0.11 K/uL    NRBC (Absolute) 0.00 K/uL   COMP METABOLIC PANEL    Collection Time: 05/12/17 10:35 PM   Result Value Ref Range    Sodium 138 135 - 145 mmol/L    Potassium 3.3 (L) 3.6 - 5.5 mmol/L    Chloride 107 96 - 112 mmol/L    Co2 23 20 - 33 mmol/L    Anion Gap 8.0 0.0 - 11.9    Glucose 89 65 - 99 mg/dL    Bun 13 8 - 22 mg/dL    Creatinine 0.73 0.50 - 1.40 mg/dL    Calcium 9.1 8.5 - 10.5 mg/dL    AST(SGOT) 11 (L) 12 - 45 U/L    ALT(SGPT) 7 2 - 50 U/L    Alkaline Phosphatase 44 30 - 99 U/L    Total Bilirubin 0.4 0.1 - 1.5 mg/dL    Albumin 4.2 3.2 - 4.9 g/dL    Total Protein 6.3 6.0 - 8.2 g/dL    Globulin 2.1 1.9 - 3.5 g/dL    A-G Ratio 2.0 g/dL   Blood Alcohol    Collection Time: 05/12/17 10:35 PM   Result Value Ref Range    Diagnostic Alcohol 0.00 0.00 g/dL   Acetaminophen Level    Collection Time: 05/12/17 10:35 PM   Result Value Ref Range    Acetaminophen -Tylenol <10 10 - 30 ug/mL   SALICYLATE LEVEL     Collection Time: 17 10:35 PM   Result Value Ref Range    Salicylates, Quant. 0 (L) 15 - 25 mg/dL   ESTIMATED GFR    Collection Time: 17 10:35 PM   Result Value Ref Range    GFR If African American >60 >60 mL/min/1.73 m 2    GFR If Non African American >60 >60 mL/min/1.73 m 2   POC BREATHALIZER    Collection Time: 17 10:37 PM   Result Value Ref Range    POC Breathalizer 0.00 0.00 - 0.01 Percent   EKG (NOW)    Collection Time: 17 11:24 PM   Result Value Ref Range    Report       [facility] Emergency Dept.    Test Date:  2017  Pt Name:    TAWANDA MAC               Department: ER  MRN:        [id number]                      Room:       [facility]  Gender:     F                            Technician: [id number]  :        1985                   Requested By:SHILO HODGE  Order #:    [id number]                    Reading MD:    Measurements  Intervals                                Axis  Rate:       70                           P:          55  LA:         128                          QRS:        63  QRSD:       84                           T:          16  QT:         484  QTc:        523    Interpretive Statements  SINUS RHYTHM  BORDERLINE T ABNORMALITIES, INFERIOR LEADS  PROLONGED QT INTERVAL  No previous ECG available for comparison     POC BREATHALIZER    Collection Time: 17 11:58 PM   Result Value Ref Range    POC Breathalizer 0.0 0.00 - 0.01 Percent       Imaging/Procedures Review:    ndependant Imaging Review: none  No orders to display      EKG:   EKG Independant Review: Completed  QTc:523  HR: 70  QRS: 84  LA: 128  Sinus rhythm  T-wave abnormalities in leads II, III, and aVF  Prolonged QTC    Records reviewed and summarized in current documentation       Assessment/Plan   # Drug overdose with suicidal intent  - Patient ingested about 26 tablets or 3 mg risperidone at 1930 this evening  - Patient states she had suicidal intent wanting to harm herself because  "she thought \"the world had ended\". History of multiple suicide attempts in the past using of drug overdose and cutting.  - She states that she has an extensive psych history although was not able to elaborate at this point in time, she does presents with prescription bottles for both risperidone and Lamictal.  - QTC prolonged at 523 and potassium low at 3.3; repleting with 40 mEq KCl PO and starting IV fluids with 40 mEq KCl per 1L at 150 mL/hr after IV bolus  - UDS negative; pregnancy test pending  Plan:  - We will hold initiated by the ED, will continue  - Inpatient suicide protocol  - Consulted psychiatry, the team to contact when patient alert enough to participate in  - Monitor K, mag, phosphorus and replete as necessary  - Monitor EKG particularly for QTC  - Telemetry monitoring, continuous pulse ox, q4hr neuro checks  - Continue IV fluids; already received 1 L IV fluid bolus and have ordered a second, will continue at 150 mils per hour after  - Lorazepam PRN for agitation  - Aspiration, seizure, and fall precautions  - Will hold outpatient medications of risperidone and Lamictal due to current overdose    # Tobacco use  - Nicotine replacement  - Tobacco cessation education and counseling when appropriate    Anticipated Hospital stay:  >2 midnights    Quality Measures  EKG reviewed, Labs reviewed and Medications reviewed  Duncan catheter: No Duncan      DVT Prophylaxis: Enoxaparin (Lovenox)    Ulcer prophylaxis: Not indicated            Addendum 0530:  Patient became more hypotensive in ED despite IVFs. Was evaluated by critical care who agree to admit to ICU.       "

## 2017-05-13 NOTE — PROGRESS NOTES
Pt arrived to S621. Pt A&Ox4. Admit profile completed. All cords removed. Safety education provided, pt verbalizes understanding. Pt on legal hold and 1:1 obs. Sitter at bedside.

## 2017-05-13 NOTE — PROGRESS NOTES
[employer] Gold Team Attending Note  (see full Resident note dictated in EPIC)    Date of Service 5/13/2017    Assessment:  - SI/SA - took 26-3mg risperidone tabs, utox (-),   - qtc 523  - Hypokalemia  - marginal hypotension  - Hx of multiple suicide attempts   - + tob use    Plan:  - q4 ecg until qtc normalizes  - prn bolus  - may need bicarb  - legal hold and psych eval  - replace k      I have seen and examined the patient today.  I have reviewed the medical record, laboratory data, imaging and all relevant studies.  I have discussed the plan of care with the Internal Medicine Resident and agree with the note and plan as documented.       Total critical care time, not including billable procedures 35 minutes.

## 2017-05-13 NOTE — ED NOTES
"Chief Complaint   Patient presents with   • Drug Overdose   • Suicidal Ideation     BP 96/51 mmHg  Pulse 64  Temp(Src) 36.6 °C (97.9 °F)  Resp 35  Ht 1.549 m (5' 1\")  Wt 61.236 kg (135 lb)  BMI 25.52 kg/m2  SpO2 98%    Pt brought in by REILLY from [facility], pt reports SI states \"I thought the world was going to end so I wanted to kill myself.\" Pt also reports hearing voices that are telling her to harm herself. Denies HI. Pt admits to taking 78 mg of Risperdal. Reporting feeling tired and dizzy. Urine sample collected and sent to lab. SAD score 6.  pta. Placed in room RD. Complaints and vitals as above. Pt on monitors, all alarms audible. Chart flagged for ERP to see.  "

## 2017-05-13 NOTE — ED PROVIDER NOTES
"ED Provider Note    Scribed for Dr. Benny Bravo M.D. by Korey Olmstead. 5/12/2017  11:03 PM    Primary care provider: None noted  Means of arrival: Walk in  History obtained from: Patient  History limited by: None    CHIEF COMPLAINT  Chief Complaint   Patient presents with   • Drug Overdose   • Suicidal Ideation       HPI  Miranda Sykes is a 32 y.o. female who presents to the Emergency Department for evaluation after overdosing on Risperdal which she took at approximately 7:30 PM today. Per nursing note, patient was at the [facility] earlier today when she experienced associated suicidal ideations. Patient states she took a total of 78 mg Risperdal because she \"wanted to kill myself.\" She also notes associated increased hunger, tiredness and dizziness exacerbated when standing up. She does not report any recent fevers. Patient denies any history of diabetes or hypertension. She states low blood pressure runs in her family.    REVIEW OF SYSTEMS  Pertinent positives include suicidal ideations, tiredness, increased hunger, dizziness. Pertinent negatives include no fevers. As above, all other systems reviewed and are negative.   See HPI for further details.   C    PAST MEDICAL HISTORY   None noted    SURGICAL HISTORY  patient denies any surgical history    SOCIAL HISTORY  None noted    FAMILY HISTORY  Hypotension    CURRENT MEDICATIONS  No current facility-administered medications on file prior to encounter.     No current outpatient prescriptions on file prior to encounter.      ALLERGIES  No Known Allergies    PHYSICAL EXAM  VITAL SIGNS: BP 96/51 mmHg  Pulse 60  Temp(Src) 36.6 °C (97.9 °F)  Resp 15  Ht 1.549 m (5' 1\")  Wt 61.236 kg (135 lb)  BMI 25.52 kg/m2  SpO2 98%  Constitutional: Well developed, Well nourished, no distress, Non-toxic appearance. Sleepy but arouseable, answer questions, oriented  HENT: Normocephalic, Atraumatic, Bilateral external ears normal, Oropharynx moist, No oral exudates. "   Eyes: PERRLA, EOMI, Conjunctiva normal, No discharge.   Neck: No tenderness, Supple, No stridor.   Lymphatic: No lymphadenopathy noted.   Cardiovascular: Normal heart rate, Normal rhythm.   Thorax & Lungs: Clear to auscultation bilaterally, No respiratory distress, No wheezing, No crackles.   Abdomen: Soft, No tenderness, No masses, No pulsatile masses.   Skin: Warm, Dry, No erythema, No rash.   Extremities:, No edema No cyanosis.   Musculoskeletal: No tenderness to palpation or major deformities noted.  Intact distal pulses  Neurologic: Moves all extremities spontaneously. Sleepy but arouseable, answer questions, oriented  Psychiatric: Flat affect.     LABS  Results for orders placed or performed during the hospital encounter of 05/12/17   URINE DRUG SCREEN (TRIAGE)   Result Value Ref Range    Amphetamines Urine Negative Negative    Barbiturates Negative Negative    Benzodiazepines Negative Negative    Cocaine Metabolite Negative Negative    Methadone Negative Negative    Ecstasy Negative Negative    Opiates Negative Negative    Oxycodone Negative Negative    Phencyclidine -Pcp Negative Negative    Propoxyphene Negative Negative    Cannabinoid Metab Negative Negative   CBC WITH DIFFERENTIAL   Result Value Ref Range    WBC 7.5 4.8 - 10.8 K/uL    RBC 3.90 (L) 4.20 - 5.40 M/uL    Hemoglobin 12.1 12.0 - 16.0 g/dL    Hematocrit 36.5 (L) 37.0 - 47.0 %    MCV 93.6 81.4 - 97.8 fL    MCH 31.0 27.0 - 33.0 pg    MCHC 33.2 (L) 33.6 - 35.0 g/dL    RDW 42.9 35.9 - 50.0 fL    Platelet Count 178 164 - 446 K/uL    MPV 12.6 9.0 - 12.9 fL    Neutrophils-Polys 63.50 44.00 - 72.00 %    Lymphocytes 25.20 22.00 - 41.00 %    Monocytes 9.30 0.00 - 13.40 %    Eosinophils 1.30 0.00 - 6.90 %    Basophils 0.40 0.00 - 1.80 %    Immature Granulocytes 0.30 0.00 - 0.90 %    Nucleated RBC 0.00 /100 WBC    Neutrophils (Absolute) 4.79 2.00 - 7.15 K/uL    Lymphs (Absolute) 1.90 1.00 - 4.80 K/uL    Monos (Absolute) 0.70 0.00 - 0.85 K/uL    Eos  (Absolute) 0.10 0.00 - 0.51 K/uL    Baso (Absolute) 0.03 0.00 - 0.12 K/uL    Immature Granulocytes (abs) 0.02 0.00 - 0.11 K/uL    NRBC (Absolute) 0.00 K/uL   COMP METABOLIC PANEL   Result Value Ref Range    Sodium 138 135 - 145 mmol/L    Potassium 3.3 (L) 3.6 - 5.5 mmol/L    Chloride 107 96 - 112 mmol/L    Co2 23 20 - 33 mmol/L    Anion Gap 8.0 0.0 - 11.9    Glucose 89 65 - 99 mg/dL    Bun 13 8 - 22 mg/dL    Creatinine 0.73 0.50 - 1.40 mg/dL    Calcium 9.1 8.5 - 10.5 mg/dL    AST(SGOT) 11 (L) 12 - 45 U/L    ALT(SGPT) 7 2 - 50 U/L    Alkaline Phosphatase 44 30 - 99 U/L    Total Bilirubin 0.4 0.1 - 1.5 mg/dL    Albumin 4.2 3.2 - 4.9 g/dL    Total Protein 6.3 6.0 - 8.2 g/dL    Globulin 2.1 1.9 - 3.5 g/dL    A-G Ratio 2.0 g/dL   Blood Alcohol   Result Value Ref Range    Diagnostic Alcohol 0.00 0.00 g/dL   Acetaminophen Level   Result Value Ref Range    Acetaminophen -Tylenol <10 10 - 30 ug/mL   SALICYLATE LEVEL   Result Value Ref Range    Salicylates, Quant. 0 (L) 15 - 25 mg/dL   ESTIMATED GFR   Result Value Ref Range    GFR If African American >60 >60 mL/min/1.73 m 2    GFR If Non African American >60 >60 mL/min/1.73 m 2   POC BREATHALIZER   Result Value Ref Range    POC Breathalizer 0.00 0.00 - 0.01 Percent   EKG (NOW)   Result Value Ref Range    Report       [facility] Emergency Dept.    Test Date:  2017  Pt Name:    TAWANDA MAC               Department: ER  MRN:        [id number]                      Room:       RD 05  Gender:     F                            Technician: [id number]  :        1985                   Requested By:SHILO HODGE  Order #:    [id number]                    Reading MD:    Measurements  Intervals                                Axis  Rate:       70                           P:          55  SD:         128                          QRS:        63  QRSD:       84                           T:          16  QT:         484  QTc:        523    Interpretive  "Statements  SINUS RHYTHM  BORDERLINE T ABNORMALITIES, INFERIOR LEADS  PROLONGED QT INTERVAL  No previous ECG available for comparison        All labs reviewed by me.    EKG  Interpreted by me  Rhythm:  Normal sinus rhythm   Rate: 70  Axis: normal  Ectopy: none  Conduction: Prolonged QT interval  ST Segments: no acute change  T Waves: no acute change  Q Waves: none  Clinical Impression: Prolonged QT interval, possible effect of Risperdal      COURSE & MEDICAL DECISION MAKING  Pertinent Labs & Imaging studies reviewed. (See chart for details)    11:03 PM - Patient seen and examined at bedside. The patient will be resuscitated with 1L NS IV due to due to low blood pressure. Ordered POC breathalizer, CBC, CMP, blood alcohol, acetaminophen level, salicylate level, POC breathalizer, urine drug screen, EKG to evaluate her symptoms. The differential diagnoses include but are not limited to: suicidal ideation    11:40 PM Paged [facility]    11:52 PM - I discussed the patient's case and the above findings with [facility] who will admit the patient.    Decision Making:  This patient presented after an intentional drug overdose with Risperdal. She is rather weak and dizzy orthostatic hypotension type symptoms with a supine blood pressure was somewhat low, may well be secondary to Risperdal. In addition the patient is \"prolonged QT interval and see EKG, a possible effect of the Risperdal should be monitored at least overnight discussed with Landry about admission discussed with patient    DISPOSITION:  Patient will be admitted to [facility] in guarded condition.     FINAL IMPRESSION  Drug overdose  Suicidal ideation      Korey RAMIREZ (Charity), am scribing for, and in the presence of, Benny Bravo M.D..    Electronically signed by: Korey Olmstead (Charity), 5/12/2017    Benny RAMIREZ M.D. personally performed the services described in this documentation, as scribed by Korey " EMILY Olmstead in my presence, and it is both accurate and complete.    The note accurately reflects work and decisions made by me.  Benny Bravo  5/13/2017  1:16 AM

## 2017-05-13 NOTE — PROGRESS NOTES
"[facility] ICU Progress Note      Admit Date: 2017  ICU Day: 1    Resident(s): Cristal Clark V  Attending: ASHLEY GRIFFITH/ Dr. Prater    Date & Time:   2017   10:57 AM       Patient ID:    Name:             Miranda Sykes     YOB: 1985  Age:                 32 y.o.  female   MRN:               [id number]    Diagnosis:    Suicide attempt  Risperidone overdose  Hypotension  Prolonged QTc   Tobacco abuse    HPI:    32-year-old female who presents to the ED with drug overdose. She was brought in after being found by her on after ingesting about 26 pills of 3 mg risperidone. She states that she wanted to kill herself because she thought \"the world and ended \" and that she thought that all of her family members have . She is unable to explain why she thought that all of her family members have  stating only that \"[she has] an extensive psych history\". She states that she is seen by a psychiatrist in [place] where she lives, has only been in [place] for 3 days. Denies any medical problems other than psych. Denies any medications other than risperidone (though a bottle of lamotrigine accompanied her to EGD). Apparently normally does well on Risperdal; has only been taking sporadically lately; tends to stop taking her medication when she started smoking marijuana. States that she started smoking marijuana and stop taking her medication recently.  Contacted poison control who recommended monitoring electrolytes and optimizing for prevention of arrhythmia, monitoring EKG particularly for QTC prolongation (currently already prolonged at 523), IV fluids, protecting airway if becomes necessary, benzos for agitation, and observing for minimum of 6-8 hours. Case number [id number].    Consultants:  Psychiatry   PMA:     Interval Events:    Patient was admitted overnight. Patient is still in ED in the morning as there is no bed available in ICU.  Patient was initially admitted to medical floor, but as her BP was " on the lower side, decided to admitted to ICU for close monitoring.  QTc decreased from initial EKG  Patient denies SI today morning  Denies lightheadedness, chest pain, sob, palpitation. Patient was able to ambulate to rest room with no problems    PHYSICAL EXAM  Gen: NAD  HEENT: NC/AT, no scleral icterus, no conjunctival injection, mucous membranes dry.  Neck: Supple, no lymphadenopathy.  Cardiac: S1S2, RRR, no m/r/g, no JVD.  Respiratory: Normal effort, symmetrical, CTA b/l.  Abdomen: BS+, soft, NT/ND, no rebound/guarding, no palpable organomegaly.  Ext: No edema, 2+ DP pulses b/l.  Skin: Warm, dry. No rashes or erythema.   Neuro: AAOx4, CN II-XII grossly normal, no focal sensory or motor deficits.   Psych: Affect, mood, judgement normal.    Respiratory:     Respiration: (!) 21, Pulse Oximetry: 95 %    Chest Tube Drains:          Invalid input(s): [id number]    HemoDynamics:  Pulse: (!) 58, Heart Rate (Monitored): (!) 59 Blood Pressure: (!) 96/51 mmHg, NIBP: (!) 90/45 mmHg      Neuro:      Fluids:      No intake or output data in the 24 hours ending 17 1057    Weight: 61.236 kg (135 lb)  Body mass index is 25.52 kg/(m^2).    Recent Labs      17   SODIUM  138  140   POTASSIUM  3.3*  4.7   CHLORIDE  107  115*   CO2  23  21   BUN  13  8   CREATININE  0.73  0.55   MAGNESIUM  1.9  1.8   PHOSPHORUS  3.3  3.1   CALCIUM  9.1  8.2*       GI/Nutrition:  Recent Labs      17   ALTSGPT  7  6   ASTSGOT  11*  10*   ALKPHOSPHAT  44  35   TBILIRUBIN  0.4  0.4   GLUCOSE  89  92       Heme:  Recent Labs      17   RBC  3.90*  3.48*   HEMOGLOBIN  12.1  10.8*   HEMATOCRIT  36.5*  32.9*   PLATELETCT  178  148*       Infectious Disease:  Temp  Av.6 °C (97.9 °F)  Min: 36.6 °C (97.9 °F)  Max: 36.6 °C (97.9 °F)  Recent Labs      17   2235  17   0429   WBC  7.5  7.8   NEUTSPOLYS  63.50  67.00   LYMPHOCYTES  25.20  24.50  "  MONOCYTES  9.30  6.80   EOSINOPHILS  1.30  1.10   BASOPHILS  0.40  0.30   ASTSGOT  11*  10*   ALTSGPT  7  6   ALKPHOSPHAT  44  35   TBILIRUBIN  0.4  0.4       Meds:  • 0.9 % NaCl with KCl 40 mEq 1,000 mL   150 mL/hr at 05/13/17 0224   • senna-docusate  2 Tab      And   • polyethylene glycol/lytes  1 Packet      And   • magnesium hydroxide  30 mL      And   • bisacodyl  10 mg     • enoxaparin  40 mg     • acetaminophen  650 mg     • nicotine  21 mg      And   • nicotine polacrilex  2 mg     • lorazepam  1 mg          Problem and Plan:      Suicide attempt with risperidone overdose  - Patient ingested about 26 tablets or 3 mg risperidone at 1930 on 5/12/17. She denies taking any other medications other than risperidone  - Patient states she had suicidal intent wanting to harm herself because she thought \"the world had ended\". History of multiple suicide attempts in the past using drug overdose and cutting.  - On admission: QTC prolonged at 523 and potassium low at 3.3, UDS negative; pregnancy test : negative  - con't legal hold  - Consulted psychiatry, left message  - EKG Q4H until QTc stabilize. Monitor electrolytes  - Continue IV fluids. Received iv fluid bolus  - Lorazepam PRN for agitation  - Aspiration, seizure, and fall precautions  - Will hold outpatient medications of risperidone and Lamictal due to current overdose. Appreciate psych recommendation  - BP has been stable since am, ctm    Tobacco abuse  - nicotine replacement    DISPO: Possible transfer to [facility] if BP stable    CODE STATUS: Full code    Quality Measures:  Duncan Catheter:  DVT Prophylaxis: Lovenox  Ulcer Prophylaxis: NA  Antibiotics: None  Lines:piv    Procedures:  None          "

## 2017-05-13 NOTE — CARE PLAN
Problem: Safety  Goal: Will remain free from injury  Outcome: PROGRESSING AS EXPECTED  Pt. On suicide precaution, 1:1 sitter at bedside.     Problem: Venous Thromboembolism (VTW)/Deep Vein Thrombosis (DVT) Prevention:  Goal: Patient will participate in Venous Thrombosis (VTE)/Deep Vein Thrombosis (DVT)Prevention Measures  Outcome: PROGRESSING AS EXPECTED    05/13/17 1642   OTHER   Pharmacologic Prophylaxis Used LMWH: Enoxaparin(Lovenox)         Problem: Knowledge Deficit  Goal: Knowledge of disease process/condition, treatment plan, diagnostic tests, and medications will improve  Outcome: PROGRESSING AS EXPECTED  Pt. Admitted for suicidal attempt, on legal hold status and pt. Is aware of this. Answered questions regarding legal hold.

## 2017-05-13 NOTE — PROGRESS NOTES
Pt persistently hypotensive  in the ED even after 3L boluse spoke with ICU resident and  ICU attending who have accepted the Patient to ICU

## 2017-05-13 NOTE — ED NOTES
Pt BP still in 80s.  S/W Dr. Massey and notified of BP status.  Telephone order for 1L NS bolus placed.

## 2017-05-13 NOTE — SENIOR ADMIT NOTE
HPI:  Pt is  a 32-year-old female with hx of 5-6 suicidal attempts , unknown extensive psych hx(per pt)  presents to the ED with drug overdose. Pt states she took 26 pills of resperidone  At 7.30pm with suicidal intention. Pt's aunt found her and called EMS.Pt states that she wanted to kill herself because she thought the world has ended  and  all of her family members have .     Denies any medications other than risperidone (though a bottle of lamotrigine accompanied her to EGD).     Current smoker 1ppd, Smoking since age of 19; denies alcohol use, uses marijuana    Contacted poison control who recommended monitoring electrolytes and optimizing for prevention of arrhythmia, monitoring EKG particularly for QTC prolongation (currently already prolonged at 523), IV fluids, protecting airway if becomes necessary, benzos for agitation, and observing for minimum of 6-8 hours. Case number [id number].      Exam:  General: Somnolent  HEENT: grossly normal   Cardiovascular: Normal heart rate, Normal rhythm   Lungs: Respiratory effort is normal. Normal breath sounds  Abdomen: Bowel sounds normal, Soft, No tenderness  Skin: No erythema, No rash  Lower limbs: normal, no pitting edema   Neurologic: Alert & oriented x 3, Normal motor function, Normal sensory function, No focal deficits noted, cranial nerves II through XII are normal  PSY: stable mood    VS: BP 96/51 mmHg  Pulse 67  Temp(Src) 36.6 °C (97.9 °F)  Resp 23 SpO2 96%  O2 therapy: Pulse Oximetry: 96 %, O2 Delivery: None (Room Air)    Labs: hypokalemia, hypomagnesemia  EKG Qtc prolonged 523      Assessment and plan:  Drug overdose: Risperidone 26 tabs at 7.30pm  Suicidal intention  Prolonged Qtc  Hypotension  Hypokalemia, hypomagnesemia    Plan  Legal hold  Monitor Qtc q4hrs  Monitor electrolytes and replete  Monitor HR, BP  Iv fluids   Psych consulted, day team to call        For further information please refer to intern H&P

## 2017-05-14 PROBLEM — F20.9 SCHIZOPHRENIA (HCC): Status: ACTIVE | Noted: 2017-05-14

## 2017-05-14 PROBLEM — F25.0 SCHIZOAFFECTIVE DISORDER, BIPOLAR TYPE (HCC): Status: ACTIVE | Noted: 2017-05-14

## 2017-05-14 LAB
ALBUMIN SERPL BCP-MCNC: 3.3 G/DL (ref 3.2–4.9)
ALBUMIN/GLOB SERPL: 1.7 G/DL
ALP SERPL-CCNC: 37 U/L (ref 30–99)
ALT SERPL-CCNC: 6 U/L (ref 2–50)
ANION GAP SERPL CALC-SCNC: 4 MMOL/L (ref 0–11.9)
AST SERPL-CCNC: 9 U/L (ref 12–45)
BASOPHILS # BLD AUTO: 0.6 % (ref 0–1.8)
BASOPHILS # BLD: 0.03 K/UL (ref 0–0.12)
BILIRUB SERPL-MCNC: 0.2 MG/DL (ref 0.1–1.5)
BUN SERPL-MCNC: 8 MG/DL (ref 8–22)
CALCIUM SERPL-MCNC: 8.7 MG/DL (ref 8.5–10.5)
CHLORIDE SERPL-SCNC: 114 MMOL/L (ref 96–112)
CO2 SERPL-SCNC: 24 MMOL/L (ref 20–33)
CREAT SERPL-MCNC: 0.68 MG/DL (ref 0.5–1.4)
EKG IMPRESSION: NORMAL
EKG IMPRESSION: NORMAL
EOSINOPHIL # BLD AUTO: 0.12 K/UL (ref 0–0.51)
EOSINOPHIL NFR BLD: 2.4 % (ref 0–6.9)
ERYTHROCYTE [DISTWIDTH] IN BLOOD BY AUTOMATED COUNT: 45.7 FL (ref 35.9–50)
GFR SERPL CREATININE-BSD FRML MDRD: >60 ML/MIN/1.73 M 2
GLOBULIN SER CALC-MCNC: 2 G/DL (ref 1.9–3.5)
GLUCOSE SERPL-MCNC: 96 MG/DL (ref 65–99)
HCT VFR BLD AUTO: 36.4 % (ref 37–47)
HGB BLD-MCNC: 11.6 G/DL (ref 12–16)
IMM GRANULOCYTES # BLD AUTO: 0.01 K/UL (ref 0–0.11)
IMM GRANULOCYTES NFR BLD AUTO: 0.2 % (ref 0–0.9)
LYMPHOCYTES # BLD AUTO: 2.19 K/UL (ref 1–4.8)
LYMPHOCYTES NFR BLD: 42.9 % (ref 22–41)
MCH RBC QN AUTO: 30.5 PG (ref 27–33)
MCHC RBC AUTO-ENTMCNC: 31.9 G/DL (ref 33.6–35)
MCV RBC AUTO: 95.8 FL (ref 81.4–97.8)
MONOCYTES # BLD AUTO: 0.57 K/UL (ref 0–0.85)
MONOCYTES NFR BLD AUTO: 11.2 % (ref 0–13.4)
NEUTROPHILS # BLD AUTO: 2.18 K/UL (ref 2–7.15)
NEUTROPHILS NFR BLD: 42.7 % (ref 44–72)
NRBC # BLD AUTO: 0 K/UL
NRBC BLD AUTO-RTO: 0 /100 WBC
PLATELET # BLD AUTO: 168 K/UL (ref 164–446)
PMV BLD AUTO: 12.4 FL (ref 9–12.9)
POTASSIUM SERPL-SCNC: 4.1 MMOL/L (ref 3.6–5.5)
PROT SERPL-MCNC: 5.3 G/DL (ref 6–8.2)
RBC # BLD AUTO: 3.8 M/UL (ref 4.2–5.4)
SODIUM SERPL-SCNC: 142 MMOL/L (ref 135–145)
WBC # BLD AUTO: 5.1 K/UL (ref 4.8–10.8)

## 2017-05-14 PROCEDURE — 700105 HCHG RX REV CODE 258: Performed by: HOSPITALIST

## 2017-05-14 PROCEDURE — A9270 NON-COVERED ITEM OR SERVICE: HCPCS | Performed by: PSYCHIATRY & NEUROLOGY

## 2017-05-14 PROCEDURE — 700102 HCHG RX REV CODE 250 W/ 637 OVERRIDE(OP): Performed by: STUDENT IN AN ORGANIZED HEALTH CARE EDUCATION/TRAINING PROGRAM

## 2017-05-14 PROCEDURE — 700102 HCHG RX REV CODE 250 W/ 637 OVERRIDE(OP): Performed by: PSYCHIATRY & NEUROLOGY

## 2017-05-14 PROCEDURE — 93005 ELECTROCARDIOGRAM TRACING: CPT | Performed by: HOSPITALIST

## 2017-05-14 PROCEDURE — 700111 HCHG RX REV CODE 636 W/ 250 OVERRIDE (IP): Performed by: STUDENT IN AN ORGANIZED HEALTH CARE EDUCATION/TRAINING PROGRAM

## 2017-05-14 PROCEDURE — 93010 ELECTROCARDIOGRAM REPORT: CPT | Performed by: INTERNAL MEDICINE

## 2017-05-14 PROCEDURE — 80053 COMPREHEN METABOLIC PANEL: CPT

## 2017-05-14 PROCEDURE — 36415 COLL VENOUS BLD VENIPUNCTURE: CPT

## 2017-05-14 PROCEDURE — 99231 SBSQ HOSP IP/OBS SF/LOW 25: CPT | Mod: GC | Performed by: INTERNAL MEDICINE

## 2017-05-14 PROCEDURE — 770006 HCHG ROOM/CARE - MED/SURG/GYN SEMI*

## 2017-05-14 PROCEDURE — A9270 NON-COVERED ITEM OR SERVICE: HCPCS | Performed by: STUDENT IN AN ORGANIZED HEALTH CARE EDUCATION/TRAINING PROGRAM

## 2017-05-14 PROCEDURE — 85025 COMPLETE CBC W/AUTO DIFF WBC: CPT

## 2017-05-14 RX ADMIN — SODIUM CHLORIDE: 9 INJECTION, SOLUTION INTRAVENOUS at 02:25

## 2017-05-14 RX ADMIN — ENOXAPARIN SODIUM 40 MG: 100 INJECTION SUBCUTANEOUS at 08:30

## 2017-05-14 RX ADMIN — LAMOTRIGINE 200 MG: 100 TABLET ORAL at 08:29

## 2017-05-14 RX ADMIN — NICOTINE 21 MG: 21 PATCH, EXTENDED RELEASE TRANSDERMAL at 06:00

## 2017-05-14 ASSESSMENT — ENCOUNTER SYMPTOMS
DEPRESSION: 0
MYALGIAS: 0
COUGH: 0
SORE THROAT: 0
NERVOUS/ANXIOUS: 0
LOSS OF CONSCIOUSNESS: 0
DIARRHEA: 0
WHEEZING: 0
PHOTOPHOBIA: 0
FOCAL WEAKNESS: 0
SEIZURES: 0
BLURRED VISION: 0
EYE PAIN: 0
NAUSEA: 0
TINGLING: 0
PALPITATIONS: 0
HEADACHES: 0
ABDOMINAL PAIN: 0
SENSORY CHANGE: 0
SHORTNESS OF BREATH: 0
HEMOPTYSIS: 0
DOUBLE VISION: 0
MEMORY LOSS: 0
DIZZINESS: 0
FEVER: 0
HALLUCINATIONS: 0
CONSTIPATION: 0
CHILLS: 0
VOMITING: 0
WEIGHT LOSS: 0
INSOMNIA: 0
SPUTUM PRODUCTION: 0

## 2017-05-14 ASSESSMENT — LIFESTYLE VARIABLES
SUBSTANCE_ABUSE: 0
SUBSTANCE_ABUSE: 1

## 2017-05-14 ASSESSMENT — PAIN SCALES - GENERAL
PAINLEVEL_OUTOF10: 0

## 2017-05-14 NOTE — PROGRESS NOTES
TRANSFER SUMMARY:    31 yo female with PMHx of psychiatric disorder admitted overnight for suicide attempt with risperidone. Patient was admitted to ICU initially as patient was hypotensive. Patient received iv fluids and BP was stable by morning.   Initial QTc was prolonged, repeat EKG showed QTc has decreased.  Patient is on legal hold. Psychiatry has been consulted.    Follow up:  EKG, for QTc  Prolongation  BP monitoring  Psych recommendation

## 2017-05-14 NOTE — ASSESSMENT & PLAN NOTE
Follows with outpatient psychiatry though is apparently noncompliant with her antipsychotics and admits to smoking marijuana which apparently exacerbates her psychosis  Psychiatry on board, appreciate the recommendations. Continue geodon and lamictal   PRN ativan on board.

## 2017-05-14 NOTE — CARE PLAN
Problem: Knowledge Deficit  Goal: Knowledge of disease process/condition, treatment plan, diagnostic tests, and medications will improve  Outcome: PROGRESSING AS EXPECTED  POC discussed with the pt. Answered pt. Questions regarding care/tx today.     Problem: Psychosocial Needs:  Goal: Ability to verbalize positive feelings about self will improve  Outcome: PROGRESSING AS EXPECTED  When asked about pt. Having thoughts of harming herself, pt. Verbalized no. Provided a open and comforting environment.

## 2017-05-14 NOTE — PROGRESS NOTES
MD Alcazar at bedside. RN clarified orders for Q6 EKG. Per MD okay to change order to Once daily. If Psych will resume psych meds  per MD  Then pt. Will need more frequent EKG.

## 2017-05-14 NOTE — PROGRESS NOTES
No suicidal ideation.  1:1 sitter remains at bedside.  Denies any discomfort.  No change from initial shift assessment

## 2017-05-14 NOTE — CARE PLAN
Problem: Safety  Goal: Will remain free from injury  1:1 sitter.  Safety maintained.  Pt safety teaching reinforced    Problem: Safety:  Goal: Will remain free from injury  1:1 sitter.  Safety maintained.  Pt safety teaching reinforced

## 2017-05-14 NOTE — PROGRESS NOTES
[facility] Medicine Interval Note    Name Miranda Sykes       1985   Age/Sex 32 y.o. female   MRN [id number]   Code Status FULL     After 5PM or if no immediate response to page, please call for cross-coverage  Attending/Team: Inge Call [phone] to page   1st Call - Day Intern (R1):   Gail 2nd Call - Day Sr. Resident (R2/R3):   ALMA Gutierrez         Chief complaint/ reason for interval visit (Primary Diagnosis)   Suicide attempt by risperdal overdose    Interval Problem Daily Status Update    Active Problems:    Drug overdose, intentional POA: Unknown    Hypotension POA: Unknown    Schizoaffective disorder, bipolar type (CMS-[facility]) POA: Unknown  Resolved Problems:    * No resolved hospital problems. *      Review of Systems   Constitutional: Negative for fever, chills, weight loss and malaise/fatigue.   HENT: Negative for hearing loss and tinnitus.    Eyes: Negative for double vision, photophobia and pain.   Respiratory: Negative for cough, hemoptysis, sputum production, shortness of breath and wheezing.    Cardiovascular: Negative for chest pain, palpitations and leg swelling.   Gastrointestinal: Negative for nausea, vomiting, abdominal pain, diarrhea and constipation.   Genitourinary: Negative for dysuria, urgency and frequency.   Musculoskeletal: Negative for myalgias.   Skin: Negative for itching and rash.   Neurological: Negative for dizziness, tingling, sensory change, focal weakness, seizures, loss of consciousness and headaches.   Psychiatric/Behavioral: Negative for depression, suicidal ideas, hallucinations, memory loss and substance abuse. The patient is not nervous/anxious and does not have insomnia.        Consultants/Specialty  Psychiatry ([place])    Disposition  Remain inpatient with 1:1 pending inpatient psychiatry transfer    Quality Measures  EKG reviewed, Medications reviewed, Radiology images reviewed and Labs reviewed  Duncan catheter: No  Perla      DVT Prophylaxis: Enoxaparin (Lovenox)    Ulcer prophylaxis: No    Assessed for rehab: Patient returned to prior level of function, rehabilitation not indicated at this time          Physical Exam       Filed Vitals:    05/13/17 2000 05/14/17 0400 05/14/17 0705 05/14/17 0821   BP: 100/61 96/52 80/51 96/66   Pulse: 66 82 55 80   Temp: 37 °C (98.6 °F) 36.9 °C (98.4 °F) 36.7 °C (98 °F)    Resp: 16 16 14    Height:       Weight:       SpO2: 95% 94% 94%      Body mass index is 24.34 kg/(m^2). Weight: 58.4 kg (128 lb 12 oz)  Oxygen Therapy:  Pulse Oximetry: 94 %, O2 (LPM): 0, O2 Delivery: None (Room Air)    Physical Exam   Constitutional: She is oriented to person, place, and time and well-developed, well-nourished, and in no distress. No distress.   HENT:   Head: Normocephalic and atraumatic.   Eyes: Conjunctivae and EOM are normal. Pupils are equal, round, and reactive to light. Left eye exhibits no discharge.   Neck: Normal range of motion. Neck supple. No JVD present. No tracheal deviation present. No thyromegaly present.   Cardiovascular: Normal rate, regular rhythm, normal heart sounds and intact distal pulses.  Exam reveals no gallop and no friction rub.    No murmur heard.  Pulmonary/Chest: Effort normal and breath sounds normal. No respiratory distress. She has no wheezes. She has no rales. She exhibits no tenderness.   Abdominal: Soft. Bowel sounds are normal. She exhibits no distension. There is no tenderness. There is no rebound and no guarding.   Musculoskeletal: Normal range of motion. She exhibits no edema or tenderness.   Neurological: She is alert and oriented to person, place, and time. She has normal reflexes. No cranial nerve deficit. Coordination normal. GCS score is 15.   Skin: Skin is warm and dry. No rash noted. She is not diaphoretic. No erythema.   Psychiatric: Mood, memory, affect and judgment normal.     No current suicidal or homicidal ideation or plan    Lab Data  Review:      5/14/2017  12:40 PM    Recent Labs      05/12/17 2235 05/13/17 0429 05/13/17 1844 05/14/17 0222   SODIUM  138  140  143  142   POTASSIUM  3.3*  4.7  3.9  4.1   CHLORIDE  107  115*  113*  114*   CO2  23  21  24  24   BUN  13  8  8  8   CREATININE  0.73  0.55  0.71  0.68   MAGNESIUM  1.9  1.8   --    --    PHOSPHORUS  3.3  3.1   --    --    CALCIUM  9.1  8.2*  8.7  8.7       Recent Labs      05/12/17 2235 05/13/17 0429 05/13/17 1844 05/14/17 0222   ALTSGPT  7  6   --   6   ASTSGOT  11*  10*   --   9*   ALKPHOSPHAT  44  35   --   37   TBILIRUBIN  0.4  0.4   --   0.2   GLUCOSE  89  92  127*  96       Recent Labs      05/12/17 2235 05/13/17 0429 05/14/17 0222   RBC  3.90*  3.48*  3.80*   HEMOGLOBIN  12.1  10.8*  11.6*   HEMATOCRIT  36.5*  32.9*  36.4*   PLATELETCT  178  148*  168       Recent Labs      05/12/17 2235 05/13/17 0429 05/14/17 0222   WBC  7.5  7.8  5.1   NEUTSPOLYS  63.50  67.00  42.70*   LYMPHOCYTES  25.20  24.50  42.90*   MONOCYTES  9.30  6.80  11.20   EOSINOPHILS  1.30  1.10  2.40   BASOPHILS  0.40  0.30  0.60   ASTSGOT  11*  10*  9*   ALTSGPT  7  6  6   ALKPHOSPHAT  44  35  37   TBILIRUBIN  0.4  0.4  0.2           Assessment/Plan     Drug overdose, intentional  Assessment & Plan  Overdosed on risperdal in suicide attempt  Apparently during acute psychotic episode brought on by marijuana use.  Psychiatry ([facility]) has evaluated patient, will hold antipsychotics at this time  QTc has since normalized from 523 on admission to now 409  Continue 1:1  Will need inpatient psychiatry treatment  Now medically cleared for transfer, work with case management on transfer    Hypotension  Assessment & Plan  Transient, likely secondary to overdose  Now normalized, asymptomatic  No further intervention necessary    Schizoaffective disorder, bipolar type (CMS-HCC)  Assessment & Plan  Follows with outpatient psychiatry though is apparently noncompliant with her  antipsychotics and admits to smoking marijuana which apparently exacerbates her psychosis  Psychiatry on board, appreciate the recommendations  Holding antipsychotics at this time at the discretion of psychiatry.  Will require inpatient psychiatric treatment  Will defer treatment plan to psychiatry, if antipsychotics are restarted, may need intermittent QTc monitoring at least initially

## 2017-05-14 NOTE — DISCHARGE PLANNING
Medical Social Work  Faxed Legal 2000 to [facility], page 1 and 2.  Patient is Medically Cleared.

## 2017-05-14 NOTE — PROGRESS NOTES
"                               [facility] Interval Note    Name Miranda Sykes       1985   Age/Sex 32 y.o. female   MRN [id number]   Code Status Full     After 5PM or if no immediate response to page, please call for cross-coverage  Attending/Team: Dr. Alcazar Call [phone] to page   1st Call - Day Intern (R1):   Dr. Reynolds 2nd Call - Day Sr. Resident (R2/R3):   Dr. Gutierrez         Chief complaint/ reason for interval visit (Primary Diagnosis)   Suicide attempt 2/2 drug overdose.    Interval Problem Daily Status Update      Patient is a 31 yo female w/ PMHx of schizoaffective disorder who presented to the ED w/ drug overdose and admitted to the ICU for having a low BP. She was then transferred to the floor once she stabilized. Patient was brought to hospital after ingesting ~ 26 risperidone tablets. She stated that she took this medication because she thought the world was ending and that she she wanted to kill herself as she thought all of her family had . She was told by voices to do this (they were serial killers in her head who turned into aliens). Patient is seen by psychiatry in [place] and was in [place] in a hotel room after being evicted from her home in [place]. She states that she thinks her psychiatric diseases were started by her taking meth in the past, though she states she probably had a predisposing biological condition that made her susceptible as well. Patient admitted to only taking her risperidone sporadically, especially after starting to use marijuana products on a more consistent basis (something she says leads to more hallucinations and delusional behavior). It was explained to patient that she will likely be placed in an inpatient psychiatric hospital. Patient stated that she \"gets more delusional\" in psychiatric settings, but did not disagree. She is still on a one-to-one sitter.     Initial EKG: QTc 523-->QTc 433...NSR.   Labs: Hgb of 11.6, electrolytes wnl, " UDS negative.   Active Problems:    Drug overdose, intentional POA: Unknown    Hypotension POA: Unknown    Schizophrenia (CMS-[facility])  Resolved Problems:    * No resolved hospital problems. *      Review of Systems   Constitutional: Negative for fever, chills, weight loss and malaise/fatigue.   HENT: Negative for congestion, hearing loss and sore throat.    Eyes: Negative for blurred vision, double vision and photophobia.   Respiratory: Negative for cough and shortness of breath.    Cardiovascular: Negative for chest pain, palpitations and leg swelling.   Gastrointestinal: Negative for nausea, vomiting, abdominal pain, diarrhea and constipation.   Genitourinary: Negative for dysuria, urgency and frequency.   Musculoskeletal: Negative for myalgias and joint pain.   Skin: Negative for rash.   Neurological: Negative for dizziness, sensory change, focal weakness, seizures, loss of consciousness and headaches.   Psychiatric/Behavioral: Positive for substance abuse. Negative for depression, suicidal ideas, hallucinations and memory loss. The patient is not nervous/anxious and does not have insomnia.         Denies depressed mood. NO SI/HI. Has not had auditory or visual hallucinations today. Does use marijuana on regular basis. Remote history of mushrooms, cocaine, and meth.       Consultants/Specialty  Psychiatry.    Disposition  Inpatient for placement to psychiatric facility.    Quality Measures  EKG reviewed, Medications reviewed, Radiology images reviewed and Labs reviewed  Duncan catheter: No Duncan      DVT Prophylaxis: Enoxaparin (Lovenox)    Ulcer prophylaxis: No    Assessed for rehab: Patient returned to prior level of function, rehabilitation not indicated at this time          Physical Exam       Filed Vitals:    05/13/17 2000 05/14/17 0400 05/14/17 0705 05/14/17 0821   BP: 100/61 96/52 80/51 96/66   Pulse: 66 82 55 80   Temp: 37 °C (98.6 °F) 36.9 °C (98.4 °F) 36.7 °C (98 °F)    Resp: 16 16 14    Height:        Weight:       SpO2: 95% 94% 94%      Body mass index is 24.34 kg/(m^2). Weight: 58.4 kg (128 lb 12 oz)  Oxygen Therapy:  Pulse Oximetry: 94 %, O2 (LPM): 0, O2 Delivery: None (Room Air)    Physical Exam   Constitutional: She is oriented to person, place, and time and well-developed, well-nourished, and in no distress. No distress.   HENT:   Head: Normocephalic and atraumatic.   Eyes: EOM are normal. Pupils are equal, round, and reactive to light.   Neck: Normal range of motion.   Cardiovascular: Normal rate, regular rhythm, normal heart sounds and intact distal pulses.  Exam reveals no gallop and no friction rub.    No murmur heard.  Pulmonary/Chest: Effort normal and breath sounds normal. No respiratory distress. She has no wheezes. She has no rales.   Abdominal: Soft. Bowel sounds are normal. She exhibits no distension. There is no tenderness. There is no rebound and no guarding.   Musculoskeletal: Normal range of motion. She exhibits no edema.   Neurological: She is alert and oriented to person, place, and time. She has normal reflexes. No cranial nerve deficit.   Skin: Skin is warm and dry. No rash noted. She is not diaphoretic. No erythema.   Psychiatric: Mood, memory, affect and judgment normal.         Lab Data Review:      5/14/2017  1:24 PM    Recent Labs      05/12/17 2235 05/13/17 0429 05/13/17 1844 05/14/17 0222   SODIUM  138  140  143  142   POTASSIUM  3.3*  4.7  3.9  4.1   CHLORIDE  107  115*  113*  114*   CO2  23  21  24  24   BUN  13  8  8  8   CREATININE  0.73  0.55  0.71  0.68   MAGNESIUM  1.9  1.8   --    --    PHOSPHORUS  3.3  3.1   --    --    CALCIUM  9.1  8.2*  8.7  8.7       Recent Labs      05/12/17 2235 05/13/17 0429 05/13/17 1844 05/14/17 0222   ALTSGPT  7  6   --   6   ASTSGOT  11*  10*   --   9*   ALKPHOSPHAT  44  35   --   37   TBILIRUBIN  0.4  0.4   --   0.2   GLUCOSE  89  92  127*  96       Recent Labs      05/12/17 2235  05/13/17   0429  05/14/17   0222   RBC   3.90*  3.48*  3.80*   HEMOGLOBIN  12.1  10.8*  11.6*   HEMATOCRIT  36.5*  32.9*  36.4*   PLATELETCT  178  148*  168       Recent Labs      05/12/17   2235  05/13/17   0429  05/14/17   0222   WBC  7.5  7.8  5.1   NEUTSPOLYS  63.50  67.00  42.70*   LYMPHOCYTES  25.20  24.50  42.90*   MONOCYTES  9.30  6.80  11.20   EOSINOPHILS  1.30  1.10  2.40   BASOPHILS  0.40  0.30  0.60   ASTSGOT  11*  10*  9*   ALTSGPT  7  6  6   ALKPHOSPHAT  44  35  37   TBILIRUBIN  0.4  0.4  0.2           Assessment/Plan     1. Suicide attempt 2/2 drug overdose  -Past psychiatric history (dx w/ schizoaffective disorder)  -Multiple hospitalizations in past for psychiatric illness and suicide attempts  -Takes risperidone intermittently-->took 26 pills at once on this suicide attempt  -Uses marijuana, which she states worsens her hallucinations and delusions.  -Labs wnl, except for Hgb 11.6. UDS negative.  -EKG: QTC prolonged on admission, but has normalized  Plan  -Will continue legal hold w/ one-to-one observation  -Needs to be transferred to inpatient psychiatry unit for stabilization and further treatment      2. Hypotension  -Patient's blood pressure has been on low end ranging from 80's-low 100's/51-60's.   -Risperidone has adverse reaction of severe hypotension and this is likely a result of her overdose  Plan  -Continue to monitor blood pressure  -If patient becomes symptomatic or blood pressure worsens on repeat evaluation, then would start IV fluid bolus.    3. Schizoaffective disorder  -Noncompliant with risperidal (sees outpatient psychiatry)  -Increased marijuana use which has led to more hallucinations and delusions  -Currently holding antipsychotics  -Dr. Hemphill was considering abilify, latuda, or geodon for patient. I would NOT recommend geodon out of these three medications as it has the highest risk of prolonging QTc. As atypical antipsychotics as well, abilify and latuda also have a risk of QTc prolongation, but not as prominent  as Geodon (risk of future drug overdose suicide attempt makes geodon less viable of an option) Would start on abilify 10 mg PO qd, but will defer to psychiatry recommendations.  -repeat EKG monitoring with starting antipsychotic will be necessary to ensure normal QTc ranges.      Assessment & plan notes cannot be loaded without a specified hospital service.

## 2017-05-14 NOTE — ASSESSMENT & PLAN NOTE
Transient, likely secondary to overdose with likely low baseline, asymptomatic  No further intervention necessary

## 2017-05-14 NOTE — CARE PLAN
Problem: Safety  Goal: Will remain free from injury  Admitted d/t SI. Sitter in place. Denies SI, psych on board.    Problem: Discharge Barriers/Planning  Goal: Patient’s continuum of care needs will be met  Outcome: PROGRESSING AS EXPECTED  Pending D/C planning.    Problem: Safety:  Goal: Will remain free from injury  Admitted d/t SI. Sitter in place. Denies SI, psych on board.

## 2017-05-14 NOTE — CONSULTS
"PSYCHIATRIC CONSULTATION:  Reason for admission: Suicide attempt via OD  Reason for consult: SA  Requesting Physician: Christopher Garcia MD  Psychiatric Supervising Attending: Dr. KATJA Garcia  Legal Hold Status:  +          Chief Complaint:   I was hearing a lot of voices, and seeing aliens    HPI:  Pt is a 33yo white female who presented to the ED having consumed 78mg (26x3mg) of Risperidone tablets.  Pt was brought in by her Aunt, who found her somnolent in the hotel.   Pt explains that she has been on meds for years and has been intermittently complaint.  In the last several months, the pt admits she continues to relapse with MJ, and this can exacerbate her AVH and delusions.  She admits to poor compliance with the Lamictal and Risperdone lately, and when she arrived to [place] she was smoking MJ more than normal.  Pt states she was in a psych hospital in last  in [place], but did \"not fully stabilize since then\".  Pt states she took her last Lamictal dose two days ago, and she has been taking 200mg rather than the 100mg prescribed.  Pt reports her mood has been stable, it was just the delusions and voices bothering her.  She explains the aliens and voices told her that her family was dead, so she decided to harm herself.  She denies outright CAH, but rather the voices were talking about her family being .      Pt explains she came to [place] about 3 days ago because she got a notice to vacate her [place] apartment.  Pt states she is on medical leave from [employer] job and she does not have a place to stay in [place], which a source of worry for her.  Pt states she is on disability, and also has a pending psych appt in [place] on .    Psychiatric Review of Current Symptoms:  Psychotic:  Pt reports she hear voices--a \"clarivoyant kind\" that is a connection with a person far away.  She also notes that several serial killers inhabit her head, and they turned to aliens.  Upon turning to aliens, they began telling her " "family was dead and the world had ended.  Pt denies CAH to harm self.  Denies Depression symptoms  Endorsed anxiety around housing situation  Endorsed having high energy, racing thoughts, spending money symptoms during the day but Denies multiple night of being awake    MSE:  Vitals:  Filed Vitals:    05/13/17 1315 05/13/17 1400 05/13/17 1432 05/13/17 1608   BP:   106/69 101/68   Pulse:   87 76   Temp:   36.7 °C (98.1 °F) 37.1 °C (98.7 °F)   Resp: 16  16 16   Height:  1.549 m (5' 1\")     Weight:  58.4 kg (128 lb 12 oz)     SpO2: 97%  96% 95%     Musculoskeletal: lying in hospital bed  Appearance: WNWD female, mod grooming, hospital garb, no acute distress, calm, cooperative, appropriate eye contact  Thought Process: linear, goal-oriented, not pressure, currently organized  Abnormal Thoughts/Psychosis: denies current experience of AH or delusions  Associations: currently approproiate  Speech: wnl  Language: english  Mood/Affect: jina tired /// appears euthymic, mood congruent   SI/HI: denies currently  Attention: alert  Memory: short term deficits from last night  Orientation: x4  Fund of Knowledge:  adequate  Insight and Judgment: poor/poor      Past Psych Hx:  Dx Bipolar, schizoaffective first at 17yo  Past meds: Zyprexa, Seroquel, Lamictal, Depakote, Invega (helpful), Risperdone, Abilify when 17yo  Multiple Hosp visits in [place] for psych  Multiple (5) suicide attempts in the past    Family Psych Hx:  M- Grandmother-depression  M-cousin--Bipolar, stable    Social Hx:  B/R in [facility], with Mom and step-dad, only child.  Often would interact with A/U/cousins that were close by.  Described childhood as mellow, was athletic growing up, and was pretty structured.  \"rebelled\" at 17yo, \"wanted to party\".  Began substance use at 17yo, including meth.  +HS  +24 units of college  Employed at [employer] but on medical leave  Pt reports being on disability for Bipolar  Never , single, prefers male " "partners    Drugs/Alcohol/Tobacco Hx:  Tob: 1/2-1 ppd for 9yrs  Alcohol: Sober since 2017, had intermittent sobriety over yrs since .  Sub: current MJ-- remote cocaine, shroons, Meth x 3days (induced psychosis @ 19yo)    Medical Hx:as documented. All the vitals, labs, notes, records, and the MAR. Findings of interest to psychiatry include:            Medical Conditions: deneis  Surgical Hx: denies  Allergies:Review of patient's allergies indicates no known allergies.  Medications: (current): Lamictal 200mg PO Daily and Risperdone 3mg PO Daily per pt  Labs:  Recent Labs      17   2235  17   0429   SODIUM  138  140   POTASSIUM  3.3*  4.7   CHLORIDE  107  115*   CO2  23  21   GLUCOSE  89  92   BUN  13  8         EK17 @ 0452  SINUS ARRHYTHMIA, RATE  54- 78   BORDERLINE RIGHT AXIS DEVIATION   BASELINE WANDER IN LEAD(S) V6   Compared to ECG 2017 23:24:52   Sinus rhythm no longer present   T-wave abnormality no longer present   Prolonged QT interval no longer present     Medical Review of Symptoms: (as reported by the patient). All systems reviewed. Those not listed below were found to be negative.     neg    Assessment:  Psychiatric:   #Schizoaffective, bipolar type  VS Bipolar DO with psychotic features.  Pt reports being dx when she was 19yo, although per history this could have originally been meth induced.  Pt has been treated for many years, but intermittently has poor med compliance associated with varying levels of MJ use.  More recently, pt was using more MJ and not taking the Risperidone regularly.  Pt reports she took the Risperdone due to a delusion that her family was already dead.  Pt notes that Risperdone has been intermittently effective over the years.  Pt would like meds that \"work\".  Pt claims her mood has been stable during this time.   -Given that last dose of Lamictal was two or less Half-lives ago, will restart Lamictal 200mg PO Daily, with a NOW dose.  Per " UpToDate, one can restart Lamictal so long as 5 half-life iterations have not lapsed.  -Will NOT restart an antipsychotic at the moment, will wait to continue monitoring pts heart and med conditions.   Consider Abilify, Latuda, or Geodon for the psychotic symptoms when medically warranted.    -Will CONTINUE legal hold, recommend pt be transferred to inpt unit for further eval and tx.  Discussed the reason why this would be warranted, pt agrees to inpt.    Medical: as noted by the medical treatment team.    Plan:  Legal hold: Continue legal hold  Records reviewed: yes       Medications: Resume Lamictal 200mg PO DAily  Orders: - Social workers to eval and assist pt  Collateral: obtained with permission    Sign off   Thank you for the Consult    Shannon-denies access

## 2017-05-14 NOTE — ASSESSMENT & PLAN NOTE
Overdosed on risperdal in suicide attempt  Apparently during acute psychotic episode brought on by marijuana use.  Psychiatry ([facility]) has evaluated patient, has initiated geodon  QTc has since normalized from 523 to 409, rechecked EKG after initiation of geodon showed QTc of 413  Continue 1:1  Will need inpatient psychiatry treatment  Now medically cleared for transfer, work with case management on transfer

## 2017-05-14 NOTE — PROGRESS NOTES
Pt A&Ox4. Pt on legal hold. Denies SI or thoughts of harming self. Pt refuses bed alarm, safety education provided. Safety precautions in place. 1:1 obs, sitter at bedside. No complaints of pain or discomfort. No signs of distress.

## 2017-05-14 NOTE — PROGRESS NOTES
Dr Gutierrez notified of EKG from sinus rhythm to atrial premature complex. No new orders received.
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
## 2017-05-15 LAB — EKG IMPRESSION: NORMAL

## 2017-05-15 PROCEDURE — 700102 HCHG RX REV CODE 250 W/ 637 OVERRIDE(OP): Performed by: PSYCHIATRY & NEUROLOGY

## 2017-05-15 PROCEDURE — A9270 NON-COVERED ITEM OR SERVICE: HCPCS | Performed by: PSYCHIATRY & NEUROLOGY

## 2017-05-15 PROCEDURE — 90471 IMMUNIZATION ADMIN: CPT

## 2017-05-15 PROCEDURE — 99231 SBSQ HOSP IP/OBS SF/LOW 25: CPT | Mod: GC | Performed by: INTERNAL MEDICINE

## 2017-05-15 PROCEDURE — 700102 HCHG RX REV CODE 250 W/ 637 OVERRIDE(OP): Performed by: STUDENT IN AN ORGANIZED HEALTH CARE EDUCATION/TRAINING PROGRAM

## 2017-05-15 PROCEDURE — 93005 ELECTROCARDIOGRAM TRACING: CPT | Performed by: INTERNAL MEDICINE

## 2017-05-15 PROCEDURE — 90732 PPSV23 VACC 2 YRS+ SUBQ/IM: CPT | Performed by: HOSPITALIST

## 2017-05-15 PROCEDURE — 700111 HCHG RX REV CODE 636 W/ 250 OVERRIDE (IP): Performed by: HOSPITALIST

## 2017-05-15 PROCEDURE — 770006 HCHG ROOM/CARE - MED/SURG/GYN SEMI*

## 2017-05-15 PROCEDURE — A9270 NON-COVERED ITEM OR SERVICE: HCPCS | Performed by: STUDENT IN AN ORGANIZED HEALTH CARE EDUCATION/TRAINING PROGRAM

## 2017-05-15 PROCEDURE — 700111 HCHG RX REV CODE 636 W/ 250 OVERRIDE (IP): Performed by: STUDENT IN AN ORGANIZED HEALTH CARE EDUCATION/TRAINING PROGRAM

## 2017-05-15 PROCEDURE — 93010 ELECTROCARDIOGRAM REPORT: CPT | Performed by: INTERNAL MEDICINE

## 2017-05-15 RX ORDER — ZIPRASIDONE HYDROCHLORIDE 20 MG/1
20 CAPSULE ORAL 2 TIMES DAILY
Status: DISCONTINUED | OUTPATIENT
Start: 2017-05-15 | End: 2017-05-22

## 2017-05-15 RX ADMIN — NICOTINE 21 MG: 21 PATCH, EXTENDED RELEASE TRANSDERMAL at 05:30

## 2017-05-15 RX ADMIN — ZIPRASIDONE HCL 20 MG: 20 CAPSULE ORAL at 21:06

## 2017-05-15 RX ADMIN — LAMOTRIGINE 200 MG: 100 TABLET ORAL at 08:29

## 2017-05-15 RX ADMIN — ENOXAPARIN SODIUM 40 MG: 100 INJECTION SUBCUTANEOUS at 08:30

## 2017-05-15 RX ADMIN — STANDARDIZED SENNA CONCENTRATE AND DOCUSATE SODIUM 2 TABLET: 8.6; 5 TABLET, FILM COATED ORAL at 08:29

## 2017-05-15 ASSESSMENT — ENCOUNTER SYMPTOMS
VOMITING: 0
CHILLS: 0
DIZZINESS: 0
SEIZURES: 0
FOCAL WEAKNESS: 0
TINGLING: 0
HEADACHES: 0
PHOTOPHOBIA: 0
NERVOUS/ANXIOUS: 0
MEMORY LOSS: 0
EYE PAIN: 0
FEVER: 0
SENSORY CHANGE: 0
INSOMNIA: 0
PALPITATIONS: 0
LOSS OF CONSCIOUSNESS: 0
HALLUCINATIONS: 0
ABDOMINAL PAIN: 0
WEIGHT LOSS: 0
DOUBLE VISION: 0
COUGH: 0
WHEEZING: 0
NAUSEA: 0
CONSTIPATION: 0
DEPRESSION: 0
DIARRHEA: 0
MYALGIAS: 0
HEMOPTYSIS: 0
SPUTUM PRODUCTION: 0
SHORTNESS OF BREATH: 0

## 2017-05-15 ASSESSMENT — PAIN SCALES - GENERAL: PAINLEVEL_OUTOF10: 0

## 2017-05-15 ASSESSMENT — LIFESTYLE VARIABLES: SUBSTANCE_ABUSE: 0

## 2017-05-15 NOTE — DISCHARGE PLANNING
Medical Social Work  Notified patient she was no longer on a legal hold. Patient denied any suicidal thoughts, and is agreeable to outpatient services and agreeable to having psych talk to her.

## 2017-05-15 NOTE — PROGRESS NOTES
"Patient complains of vivid \"nightmares\" upon waking; however, she is alert with appropriate affect after she \"orients herself.\"  "

## 2017-05-15 NOTE — CARE PLAN
Problem: Infection  Goal: Will remain free from infection  Outcome: PROGRESSING AS EXPECTED  No new signs or symptoms of infection at this time.    Problem: Psychosocial Needs:  Goal: Ability to identify and develop effective coping behavior will improve  Outcome: PROGRESSING AS EXPECTED  Patient calm, alert and oriented at this time.

## 2017-05-15 NOTE — PROGRESS NOTES
Patient alert and oriented with no complaints of nausea or pain at this time. Steady gait noted. 1:1 sitter currently at bedside.

## 2017-05-15 NOTE — DIETARY
"Nutrition Services: Pt seen for recent wt loss per nutrition admit screen.    Pt is a 31 yo F with and admit dx of Drug overdose, intentional, initial encounter, Hypotension    PMH: None on file, per H&P pt reported an extensive psych history    Spoke with pt at bedside, appears well nourished. Pt reports wt loss in the past few months. Reports her UBW is 145 - 150 lb. Unsure of amount of wt lost, she estimated a current wt of 140 lb. Per most recent stand-up scale wt on 5/13 = 58.4 kg (128 lb). Assuming accuracy of reported UBW, pt has ~12% wt loss in the past 3 months, wt loss is severe. Pt reports that her appetite has been \"okay\" and that she has been eating during admit d/t boredom. Lunch tray at bedside, > 75% of meal was consumed. Encouraged pt to continue eating adequate amounts of meals.     Current Diet Order: Regular - Finger foods; 75 - 100% of meals consistently consumed per ADLs.  Wt: 58.4 kg (128 lb)  BMI: 24.3  Pertinent Labs: Labs reviewed.  Pertinent Meds: Meds reviewed.  GI: Last BM 5/14    Plan/Recommendation:  Encourage adequate PO intake.  Nutrition Representative to see daily per dept policy.  Please record intake as percentage of meals consumed.  Pt continues to eat adequately and has a healthy BMI.    RD to monitor per dept policy.          "

## 2017-05-15 NOTE — PROGRESS NOTES
Pt is AOx4, no complains of pain, tolerated all oral medications, skin and sacral assessment done, on RA, call light in use,1:1 sitter at bedside, no bed alarm on, charge RN aware, treaded socks on, bed locked in low position, plan of care discussed, all needs met at this time.    Pt also verbalized that she has no intentions of hurting herself any longer when asked.

## 2017-05-15 NOTE — PROGRESS NOTES
[facility] [place]    Name Miranda Sykes       1985   Age/Sex 32 y.o. female   MRN [id number]   Code Status FULL     After 5PM or if no immediate response to page, please call for cross-coverage  Attending/Team: Inge Call [phone] to page   1st Call - Day Intern (R1):   Ken 2nd Call - Day Sr. Resident (R2/R3):   ALMA Gutierrez         Chief complaint/ reason for interval visit (Primary Diagnosis)   Suicide attempt by risperdal overdose    Interval Problem Daily Status Update    Active Problems:    Drug overdose, intentional POA: Unknown      Overview: - QTc has normalized.      - No CP, N/V, SOB, or palpitations today      - Stop daily EKGs      - Continue legal hold          Hypotension POA: Unknown      Overview: Stable and asymptomatic.       Ambulating without difficulty      Denies orthostasis    Schizophrenia (CMS-HCC) POA: Unknown      Overview: Awaiting psych facility transfer      Denies delusions [facility] AH or SI currently.  Resolved Problems:    * No resolved hospital problems. *      Review of Systems   Constitutional: Negative for fever, chills, weight loss and malaise/fatigue.   HENT: Negative for hearing loss and tinnitus.    Eyes: Negative for double vision, photophobia and pain.   Respiratory: Negative for cough, hemoptysis, sputum production, shortness of breath and wheezing.    Cardiovascular: Negative for chest pain, palpitations and leg swelling.   Gastrointestinal: Negative for nausea, vomiting, abdominal pain, diarrhea and constipation.   Genitourinary: Negative for dysuria, urgency and frequency.   Musculoskeletal: Negative for myalgias.   Skin: Negative for itching and rash.   Neurological: Negative for dizziness, tingling, sensory change, focal weakness, seizures, loss of consciousness and headaches.   Psychiatric/Behavioral: Negative for depression, suicidal ideas, hallucinations, memory loss and substance abuse. The patient is not  nervous/anxious and does not have insomnia.        Consultants/Specialty  Psychiatry ([place])    Disposition  Remain inpatient with 1:1 pending inpatient psychiatry transfer    Quality Measures  EKG reviewed, Medications reviewed, Radiology images reviewed and Labs reviewed  Duncan catheter: No Duncan      DVT Prophylaxis: Enoxaparin (Lovenox)    Ulcer prophylaxis: No    Assessed for rehab: Patient returned to prior level of function, rehabilitation not indicated at this time          Physical Exam       Filed Vitals:    05/14/17 2000 05/15/17 0400 05/15/17 0756 05/15/17 1543   BP: 97/57 108/63 101/58 111/66   Pulse: 60 58 56 93   Temp: 36.6 °C (97.8 °F) 36.3 °C (97.4 °F) 36.3 °C (97.4 °F) 36.9 °C (98.5 °F)   Resp: 16 16 18 16   Height:       Weight:       SpO2: 96% 99% 93% 95%     Body mass index is 24.34 kg/(m^2).    Oxygen Therapy:  Pulse Oximetry: 95 %, O2 (LPM): 0, O2 Delivery: None (Room Air)    Physical Exam   Constitutional: She is oriented to person, place, and time and well-developed, well-nourished, and in no distress. No distress.   HENT:   Head: Normocephalic and atraumatic.   Eyes: Conjunctivae and EOM are normal. Pupils are equal, round, and reactive to light. Left eye exhibits no discharge.   Neck: Normal range of motion. Neck supple. No JVD present. No tracheal deviation present. No thyromegaly present.   Cardiovascular: Normal rate, regular rhythm, normal heart sounds and intact distal pulses.  Exam reveals no gallop and no friction rub.    No murmur heard.  Pulmonary/Chest: Effort normal and breath sounds normal. No respiratory distress. She has no wheezes. She has no rales. She exhibits no tenderness.   Abdominal: Soft. Bowel sounds are normal. She exhibits no distension. There is no tenderness. There is no rebound and no guarding.   Musculoskeletal: Normal range of motion. She exhibits no edema or tenderness.   Neurological: She is alert and oriented to person, place, and time. She has normal  reflexes. No cranial nerve deficit. Coordination normal. GCS score is 15.   Skin: Skin is warm and dry. No rash noted. She is not diaphoretic. No erythema.   Psychiatric: Mood, memory, affect and judgment normal.     No current suicidal or homicidal ideation or plan    Lab Data Review:      5/14/2017  12:40 PM    Recent Labs      05/12/17 2235 05/13/17 0429 05/13/17 1844 05/14/17 0222   SODIUM  138  140  143  142   POTASSIUM  3.3*  4.7  3.9  4.1   CHLORIDE  107  115*  113*  114*   CO2  23  21  24  24   BUN  13  8  8  8   CREATININE  0.73  0.55  0.71  0.68   MAGNESIUM  1.9  1.8   --    --    PHOSPHORUS  3.3  3.1   --    --    CALCIUM  9.1  8.2*  8.7  8.7       Recent Labs      05/12/17 2235 05/13/17 0429 05/13/17 1844 05/14/17 0222   ALTSGPT  7  6   --   6   ASTSGOT  11*  10*   --   9*   ALKPHOSPHAT  44  35   --   37   TBILIRUBIN  0.4  0.4   --   0.2   GLUCOSE  89  92  127*  96       Recent Labs      05/12/17 2235 05/13/17 0429 05/14/17 0222   RBC  3.90*  3.48*  3.80*   HEMOGLOBIN  12.1  10.8*  11.6*   HEMATOCRIT  36.5*  32.9*  36.4*   PLATELETCT  178  148*  168       Recent Labs      05/12/17 2235 05/13/17 0429 05/14/17 0222   WBC  7.5  7.8  5.1   NEUTSPOLYS  63.50  67.00  42.70*   LYMPHOCYTES  25.20  24.50  42.90*   MONOCYTES  9.30  6.80  11.20   EOSINOPHILS  1.30  1.10  2.40   BASOPHILS  0.40  0.30  0.60   ASTSGOT  11*  10*  9*   ALTSGPT  7  6  6   ALKPHOSPHAT  44  35  37   TBILIRUBIN  0.4  0.4  0.2           Assessment/Plan     Drug overdose, intentional  Assessment & Plan  Overdosed on risperdal in suicide attempt  Apparently during acute psychotic episode brought on by marijuana use.  Psychiatry ([facility]) has evaluated patient, will hold antipsychotics at this time  QTc has since normalized from 523 on admission to now 409  Continue 1:1  Will need inpatient psychiatry treatment  Now medically cleared for transfer, work with case management on  transfer    Hypotension  Assessment & Plan  Transient, likely secondary to overdose  Now normalized, asymptomatic  No further intervention necessary    Schizophrenia (CMS-[facility])  Assessment & Plan  Follows with outpatient psychiatry though is apparently noncompliant with her antipsychotics and admits to smoking marijuana which apparently exacerbates her psychosis  Psychiatry on board, appreciate the recommendations  Holding antipsychotics at this time at the discretion of psychiatry.  Will require inpatient psychiatric treatment  Will defer treatment plan to psychiatry, if antipsychotics are restarted, may need intermittent QTc monitoring at least initially

## 2017-05-15 NOTE — CARE PLAN
Problem: Safety  Goal: Will remain free from injury  Outcome: PROGRESSING AS EXPECTED  Admitted for SI, 1:1 sitter, verbalized no desire to hurt herself, psych on board    Problem: Discharge Barriers/Planning  Goal: Patient’s continuum of care needs will be met  Outcome: PROGRESSING AS EXPECTED  Pending DC planning    Problem: Safety:  Goal: Will remain free from injury  Outcome: PROGRESSING AS EXPECTED  Admitted for SI, 1:1 sitter, verbalized no desire to hurt herself, psych on board

## 2017-05-16 PROCEDURE — 700102 HCHG RX REV CODE 250 W/ 637 OVERRIDE(OP): Performed by: PSYCHIATRY & NEUROLOGY

## 2017-05-16 PROCEDURE — A9270 NON-COVERED ITEM OR SERVICE: HCPCS | Performed by: PSYCHIATRY & NEUROLOGY

## 2017-05-16 PROCEDURE — 99231 SBSQ HOSP IP/OBS SF/LOW 25: CPT | Mod: GC | Performed by: INTERNAL MEDICINE

## 2017-05-16 PROCEDURE — 90732 PPSV23 VACC 2 YRS+ SUBQ/IM: CPT | Performed by: HOSPITALIST

## 2017-05-16 PROCEDURE — 700111 HCHG RX REV CODE 636 W/ 250 OVERRIDE (IP): Performed by: HOSPITALIST

## 2017-05-16 PROCEDURE — 700111 HCHG RX REV CODE 636 W/ 250 OVERRIDE (IP): Performed by: STUDENT IN AN ORGANIZED HEALTH CARE EDUCATION/TRAINING PROGRAM

## 2017-05-16 PROCEDURE — 700102 HCHG RX REV CODE 250 W/ 637 OVERRIDE(OP): Performed by: STUDENT IN AN ORGANIZED HEALTH CARE EDUCATION/TRAINING PROGRAM

## 2017-05-16 PROCEDURE — 770006 HCHG ROOM/CARE - MED/SURG/GYN SEMI*

## 2017-05-16 PROCEDURE — 3E0234Z INTRODUCTION OF SERUM, TOXOID AND VACCINE INTO MUSCLE, PERCUTANEOUS APPROACH: ICD-10-PCS | Performed by: INTERNAL MEDICINE

## 2017-05-16 PROCEDURE — A9270 NON-COVERED ITEM OR SERVICE: HCPCS | Performed by: STUDENT IN AN ORGANIZED HEALTH CARE EDUCATION/TRAINING PROGRAM

## 2017-05-16 RX ADMIN — LAMOTRIGINE 200 MG: 100 TABLET ORAL at 07:59

## 2017-05-16 RX ADMIN — ENOXAPARIN SODIUM 40 MG: 100 INJECTION SUBCUTANEOUS at 07:59

## 2017-05-16 RX ADMIN — PNEUMOCOCCAL VACCINE POLYVALENT 25 MCG
25; 25; 25; 25; 25; 25; 25; 25; 25; 25; 25; 25; 25; 25; 25; 25; 25; 25; 25; 25; 25; 25; 25 INJECTION, SOLUTION INTRAMUSCULAR; SUBCUTANEOUS at 11:56

## 2017-05-16 RX ADMIN — ZIPRASIDONE HCL 20 MG: 20 CAPSULE ORAL at 20:16

## 2017-05-16 RX ADMIN — STANDARDIZED SENNA CONCENTRATE AND DOCUSATE SODIUM 2 TABLET: 8.6; 5 TABLET, FILM COATED ORAL at 20:16

## 2017-05-16 RX ADMIN — ZIPRASIDONE HCL 20 MG: 20 CAPSULE ORAL at 07:59

## 2017-05-16 RX ADMIN — NICOTINE 21 MG: 21 PATCH, EXTENDED RELEASE TRANSDERMAL at 05:02

## 2017-05-16 ASSESSMENT — LIFESTYLE VARIABLES: SUBSTANCE_ABUSE: 0

## 2017-05-16 ASSESSMENT — ENCOUNTER SYMPTOMS
COUGH: 0
NERVOUS/ANXIOUS: 0
TINGLING: 0
CHILLS: 0
ABDOMINAL PAIN: 0
MYALGIAS: 0
MEMORY LOSS: 0
PALPITATIONS: 0
DIARRHEA: 0
LOSS OF CONSCIOUSNESS: 0
FOCAL WEAKNESS: 0
HEMOPTYSIS: 0
VOMITING: 0
FEVER: 0
INSOMNIA: 0
SENSORY CHANGE: 0
DIZZINESS: 0
PHOTOPHOBIA: 0
SHORTNESS OF BREATH: 0
WEIGHT LOSS: 0
NAUSEA: 0
EYE PAIN: 0
HALLUCINATIONS: 0
HEADACHES: 0
DEPRESSION: 0
SPUTUM PRODUCTION: 0
CONSTIPATION: 0
DOUBLE VISION: 0
WHEEZING: 0
SEIZURES: 0

## 2017-05-16 ASSESSMENT — PAIN SCALES - GENERAL
PAINLEVEL_OUTOF10: 0
PAINLEVEL_OUTOF10: 0

## 2017-05-16 NOTE — PSYCHIATRY
"PSYCHIATRIC FOLLOW UP:    Reason for Admission: suicide attempt via OD   Legal hold status:   Hold was dropped   Psychiatric Supervising Attending:         HPI:       31 y/o woman with a history of psychosis, originally presented to ED with OD. She is currently no longer suicidal. Hold was not extended over the weekend. However, she is still quite psychotic. She is talking about her brain \"opening up\" in the front and allowing in information about aliens that potentially killed her family; she reports she no longer thinks her family is dead but needs something to block the openness in her frontal head. She states it hasn't been burned like her cerebellum. She isn't able to tell me how she would provide for her basic needs like food and shelter.     Psychiatric Examination: observed phenomenon:  Vitals:  Filed Vitals:    05/15/17 1543 05/15/17 2000 05/16/17 0400 05/16/17 0810   BP: 111/66 114/83 98/68 113/83   Pulse: 93 99 85 81   Temp: 36.9 °C (98.5 °F) 37 °C (98.6 °F) 36.8 °C (98.3 °F) 36.7 °C (98.1 °F)   Resp: 16 18 16 18   Height:       Weight:       SpO2: 95% 98% 93% 94%       Musculoskeletal(abnormal movements, gait, etc): no psychomotor agitation or retardation   Appearance:grooming fair   Thoughts:tangential, psychotic   Speech:nl tone, rate, volume   Mood:    \"good\"  Affect:    constricted  SI/HI:   Denies   Attention/Alertness:   Awake, alert  Memory:   Impaired   Orientation:      Fund of Knowledge:      Insight/Judgement into psychiatric symptoms very poor   Neurological Testing:( ie clock, cube drawing, MMSE, MOCA,etc.)    Medical systems reviewed: (pain, new complaint, etc)   Denies pain  Denies dizziness/ HA.   All other systems reviewed and are negative.     Lab results/tests:   DX-CHEST-PORTABLE (1 VIEW)   Final Result      No evidence of acute cardiopulmonary process.        Recent Results (from the past 48 hour(s))   EKG    Collection Time: 05/15/17  5:23 AM   Result Value Ref Range    Report       " [facility]    Test Date:  2017-05-15  [facility]               Department[facility]  MRN:        [id number]                      Room:       S621  Gender:     F                            Technician: MARCO  :        1985                   Requested By:TURNER CURTIS  Order #:    [id number]                    Reading MD: Laure Weaver MD    Measurements  Intervals                                Axis  Rate:       50                           P:          -34  GA:         132                          QRS:        38  QRSD:       88                           T:          31  QT:         436  QTc:        398    Interpretive Statements  SINUS BRADYCARDIA  Compared to ECG 2017 06:43:32  NO SIGNIFICANT CHANGE    Electronically Signed On 5- 14:33:17 PDT by Laure Weaver MD            Assessment:(acuity level)  Schizoaffective Disorder, Bipolar Type     Plan:  legal hold: started hold today for inability to care for self.     Pt is requesting geodon. Her mood is better on the lamictal.  QTc is wnl.   Starting her on geodon 20mg po bid     Will follow  Thank you for the consult.

## 2017-05-16 NOTE — PROGRESS NOTES
Dr. De La O initiated a legal hold on pt due to inability to care for self per MD. Q15 close obs ordered per MD. Pt aware.

## 2017-05-16 NOTE — PROGRESS NOTES
[facility] [place] [facility] Interval [place]    Name Miranda Sykes       1985   Age/Sex 32 y.o. female   MRN [id number]   Code Status FULL     After 5PM or if no immediate response to page, please call for cross-coverage  Attending/Team: Dr. Alcazar Call [phone] to page   1st Call - Day Intern (R1):   Dr. Rogers 2nd Call - Day Sr. Resident (R2/R3):   Dr. Gutierrez         Chief complaint/ reason for interval visit (Primary Diagnosis)   Suicide attempt by risperdal overdose    Interval Problem Daily Status Update    Active Problems:    Drug overdose, intentional POA: Unknown      Overview: - QTc has normalized.      - No CP, N/V, SOB, or palpitations today      - Stop daily EKGs      - Continue legal hold          Hypotension POA: Unknown      Overview: Stable and asymptomatic.       Ambulating without difficulty      Denies orthostasis    Schizophrenia (CMS-HCC) POA: Unknown      Overview: Started on Geodon       Denies delusions [facility] AH or SI currently.      Psychiatry following  Resolved Problems:    * No resolved hospital problems. *      Review of Systems   Constitutional: Negative for fever, chills, weight loss and malaise/fatigue.   HENT: Negative for hearing loss and tinnitus.    Eyes: Negative for double vision, photophobia and pain.   Respiratory: Negative for cough, hemoptysis, sputum production, shortness of breath and wheezing.    Cardiovascular: Negative for chest pain, palpitations and leg swelling.   Gastrointestinal: Negative for nausea, vomiting, abdominal pain, diarrhea and constipation.   Genitourinary: Negative for dysuria, urgency and frequency.   Musculoskeletal: Negative for myalgias.   Skin: Negative for itching and rash.   Neurological: Negative for dizziness, tingling, sensory change, focal weakness, seizures, loss of consciousness and headaches.   Psychiatric/Behavioral: Negative for depression, suicidal ideas, hallucinations, memory loss and substance abuse.  The patient is not nervous/anxious and does not have insomnia.        Consultants/Specialty  Psychiatry ([place])    Disposition  Remain inpatient with 1:1 pending inpatient psychiatry transfer    Quality Measures  EKG reviewed, Medications reviewed, Radiology images reviewed and Labs reviewed  Duncan catheter: No Duncan      DVT Prophylaxis: Enoxaparin (Lovenox)    Ulcer prophylaxis: No    Assessed for rehab: Patient returned to prior level of function, rehabilitation not indicated at this time          Physical Exam       Filed Vitals:    05/15/17 2000 05/16/17 0400 05/16/17 0810 05/16/17 1536   BP: 114/83 98/68 113/83 103/83   Pulse: 99 85 81 95   Temp: 37 °C (98.6 °F) 36.8 °C (98.3 °F) 36.7 °C (98.1 °F) 36.7 °C (98.1 °F)   Resp: 18 16 18 18   Height:       Weight:       SpO2: 98% 93% 94% 95%     Body mass index is 24.34 kg/(m^2).    Oxygen Therapy:  Pulse Oximetry: 95 %, O2 (LPM): 0, O2 Delivery: None (Room Air)    Physical Exam   Constitutional: She is oriented to person, place, and time and well-developed, well-nourished, and in no distress. No distress.   HENT:   Head: Normocephalic and atraumatic.   Eyes: Conjunctivae and EOM are normal. Pupils are equal, round, and reactive to light. Left eye exhibits no discharge.   Neck: Normal range of motion. Neck supple. No JVD present. No tracheal deviation present. No thyromegaly present.   Cardiovascular: Normal rate, regular rhythm, normal heart sounds and intact distal pulses.  Exam reveals no gallop and no friction rub.    No murmur heard.  Pulmonary/Chest: Effort normal and breath sounds normal. No respiratory distress. She has no wheezes. She has no rales. She exhibits no tenderness.   Abdominal: Soft. Bowel sounds are normal. She exhibits no distension. There is no tenderness. There is no rebound and no guarding.   Musculoskeletal: Normal range of motion. She exhibits no edema or tenderness.   Neurological: She is alert and oriented to person, place, and time. She  has normal reflexes. No cranial nerve deficit. Coordination normal. GCS score is 15.   Skin: Skin is warm and dry. No rash noted. She is not diaphoretic. No erythema.   Psychiatric: Mood, memory, affect and judgment normal.     No current suicidal or homicidal ideation or plan    Lab Data Review:      5/14/2017  12:40 PM    Recent Labs      05/13/17 1844 05/14/17 0222   SODIUM  143  142   POTASSIUM  3.9  4.1   CHLORIDE  113*  114*   CO2  24  24   BUN  8  8   CREATININE  0.71  0.68   CALCIUM  8.7  8.7       Recent Labs      05/13/17 1844 05/14/17 0222   ALTSGPT   --   6   ASTSGOT   --   9*   ALKPHOSPHAT   --   37   TBILIRUBIN   --   0.2   GLUCOSE  127*  96       Recent Labs      05/14/17 0222   RBC  3.80*   HEMOGLOBIN  11.6*   HEMATOCRIT  36.4*   PLATELETCT  168       Recent Labs      05/14/17 0222   WBC  5.1   NEUTSPOLYS  42.70*   LYMPHOCYTES  42.90*   MONOCYTES  11.20   EOSINOPHILS  2.40   BASOPHILS  0.60   ASTSGOT  9*   ALTSGPT  6   ALKPHOSPHAT  37   TBILIRUBIN  0.2           Assessment/Plan     Drug overdose, intentional  Assessment & Plan  Overdosed on risperdal in suicide attempt  Apparently during acute psychotic episode brought on by marijuana use.  Psychiatry ([facility]) has evaluated patient, will hold antipsychotics at this time  QTc has since normalized from 523 on admission to now 409  Continue 1:1  Will need inpatient psychiatry treatment  Now medically cleared for transfer, work with case management on transfer    Hypotension  Assessment & Plan  Transient, likely secondary to overdose  Now normalized, asymptomatic  No further intervention necessary    Schizophrenia (CMS-[facility])  Assessment & Plan  Follows with outpatient psychiatry though is apparently noncompliant with her antipsychotics and admits to smoking marijuana which apparently exacerbates her psychosis  Psychiatry on board, appreciate the recommendations  Will require inpatient psychiatric treatment  Will defer treatment plan to  psychiatry, if antipsychotics are restarted

## 2017-05-16 NOTE — CARE PLAN
Problem: Safety  Goal: Will remain free from injury  Intervention: Assess behavior for possible injury to self  One on one discussion about risk for injury,patient is wearing hospital gown,room closed to nursing station,every 15 minutes monitoring patient and hourly rounding.      Problem: Safety:  Goal: Will remain free from injury  Intervention: Assess behavior for possible injury to self  One on one discussion about risk for injury,patient is wearing hospital gown,room closed to nursing station,every 15 minutes monitoring patient and hourly rounding.

## 2017-05-16 NOTE — DISCHARGE PLANNING
Medical Social Work  Faxed Legal 2000 page 1 & 2 to [facility]. Patient is not medically cleared at this time.

## 2017-05-16 NOTE — DISCHARGE PLANNING
Medical Social Work  Received petition for involuntary court ordered admission, placed in patient's chart

## 2017-05-16 NOTE — DISCHARGE PLANNING
Medical Social Work  Informed by patient's nurse, Efraín the patient does not know she is on a legal hold again.  Patient stated to him that she is not on a legal hold anymore.  Efraín requested I talk to her.    Patient stated she talked to someone last night about how she was feeling, doesn't remember what was she said really.  I asked her if she knew she was on a legal hold, patient stated no and why.  I went over the reason with her, patient stated she guess it is all right but did express she is confused on why the change.  Patient is still open to receiving help in address her m/h and medication needs.

## 2017-05-16 NOTE — CARE PLAN
Problem: Safety  Goal: Will remain free from injury  Outcome: PROGRESSING AS EXPECTED  Pt. On legal hold. Q15 checks in place.    Problem: Bowel/Gastric:  Goal: Normal bowel function is maintained or improved  Outcome: PROGRESSING AS EXPECTED  Last BM on 5/14/17. Normal BM pattern per pt. Refuses bowel protocol.    Problem: Safety:  Goal: Will remain free from injury  Outcome: PROGRESSING AS EXPECTED  Pt. On legal hold. Q15 checks in place.

## 2017-05-16 NOTE — PROGRESS NOTES
Patient alert/orientedx4,room air ,one on one discussion about plan of care ,verbalized understanding,every 15 minutes monitoring  D/t on legal hold.

## 2017-05-16 NOTE — PROGRESS NOTES
Received report from day shift RN, assumed care. Pt. Is awake, on bed. A&Ox4, up self with steady gait. Pt. Denies pain, nausea and vomiting. Due medications given, no s/s of aspiration noted. Pt. On legal hold, Q15 checks in place. Safety precautions in place. Bed kept low and locked, call light within reach, assisted as necessary.

## 2017-05-17 PROCEDURE — A9270 NON-COVERED ITEM OR SERVICE: HCPCS | Performed by: PSYCHIATRY & NEUROLOGY

## 2017-05-17 PROCEDURE — 700102 HCHG RX REV CODE 250 W/ 637 OVERRIDE(OP): Performed by: STUDENT IN AN ORGANIZED HEALTH CARE EDUCATION/TRAINING PROGRAM

## 2017-05-17 PROCEDURE — 770006 HCHG ROOM/CARE - MED/SURG/GYN SEMI*

## 2017-05-17 PROCEDURE — 700102 HCHG RX REV CODE 250 W/ 637 OVERRIDE(OP): Performed by: PSYCHIATRY & NEUROLOGY

## 2017-05-17 PROCEDURE — A9270 NON-COVERED ITEM OR SERVICE: HCPCS | Performed by: STUDENT IN AN ORGANIZED HEALTH CARE EDUCATION/TRAINING PROGRAM

## 2017-05-17 RX ORDER — LAMOTRIGINE 100 MG/1
100 TABLET ORAL ONCE
Status: DISPENSED | OUTPATIENT
Start: 2017-05-17 | End: 2017-05-18

## 2017-05-17 RX ADMIN — NICOTINE 21 MG: 21 PATCH, EXTENDED RELEASE TRANSDERMAL at 05:27

## 2017-05-17 RX ADMIN — ZIPRASIDONE HCL 20 MG: 20 CAPSULE ORAL at 20:47

## 2017-05-17 RX ADMIN — ZIPRASIDONE HCL 20 MG: 20 CAPSULE ORAL at 08:59

## 2017-05-17 RX ADMIN — LAMOTRIGINE 200 MG: 100 TABLET ORAL at 08:59

## 2017-05-17 ASSESSMENT — ENCOUNTER SYMPTOMS
SEIZURES: 0
DIZZINESS: 0
CHILLS: 0
VOMITING: 0
PALPITATIONS: 0
DIARRHEA: 0
SPUTUM PRODUCTION: 0
NAUSEA: 0
NERVOUS/ANXIOUS: 0
HEMOPTYSIS: 0
DOUBLE VISION: 0
WHEEZING: 0
INSOMNIA: 0
FOCAL WEAKNESS: 0
HALLUCINATIONS: 0
HEADACHES: 0
SHORTNESS OF BREATH: 0
ABDOMINAL PAIN: 0
DEPRESSION: 0
COUGH: 0
FEVER: 0
CONSTIPATION: 0
EYE PAIN: 0
MYALGIAS: 0
PHOTOPHOBIA: 0
MEMORY LOSS: 0
TINGLING: 0
LOSS OF CONSCIOUSNESS: 0
SENSORY CHANGE: 0
WEIGHT LOSS: 0

## 2017-05-17 ASSESSMENT — LIFESTYLE VARIABLES: SUBSTANCE_ABUSE: 0

## 2017-05-17 ASSESSMENT — PAIN SCALES - GENERAL: PAINLEVEL_OUTOF10: 0

## 2017-05-17 NOTE — PROGRESS NOTES
Assumed care of patient after report from night shift RN. Patient alert and oriented x 4. Up ad angela. Currently on 15 minute checks. Patient requested shower and this RN stood by as shower was taken. Patient calm with flat affect. Will continue to observe and provide care as needed.

## 2017-05-17 NOTE — PROGRESS NOTES
Pt being monitored every 15 minutes. Pt does not c/o pain or discomfort at this time. Able to ambulate self. Bed in lowest position. Call light within reach. Room free of clutter. Will continue to monitor and provide care.

## 2017-05-17 NOTE — DISCHARGE PLANNING
Medical Social Work    Referral: Legal hold court    Intervention: Pt presented for legal hold evaluation with . Recommendation per  is pt’s legal hold will be continued for one week (5/24/2017). Updated bedside RN and unit SW.     Plan: As above, pt’s legal hold has been extended for one week (5/24/2017). Unit SW to continue to pursue placement at inpatient  facility.

## 2017-05-18 LAB — EKG IMPRESSION: NORMAL

## 2017-05-18 PROCEDURE — A9270 NON-COVERED ITEM OR SERVICE: HCPCS | Performed by: HOSPITALIST

## 2017-05-18 PROCEDURE — 93010 ELECTROCARDIOGRAM REPORT: CPT | Performed by: INTERNAL MEDICINE

## 2017-05-18 PROCEDURE — 700102 HCHG RX REV CODE 250 W/ 637 OVERRIDE(OP): Performed by: PSYCHIATRY & NEUROLOGY

## 2017-05-18 PROCEDURE — 99231 SBSQ HOSP IP/OBS SF/LOW 25: CPT | Mod: GC | Performed by: INTERNAL MEDICINE

## 2017-05-18 PROCEDURE — 93005 ELECTROCARDIOGRAM TRACING: CPT | Performed by: HOSPITALIST

## 2017-05-18 PROCEDURE — A9270 NON-COVERED ITEM OR SERVICE: HCPCS | Performed by: PSYCHIATRY & NEUROLOGY

## 2017-05-18 PROCEDURE — 770006 HCHG ROOM/CARE - MED/SURG/GYN SEMI*

## 2017-05-18 PROCEDURE — A9270 NON-COVERED ITEM OR SERVICE: HCPCS | Performed by: STUDENT IN AN ORGANIZED HEALTH CARE EDUCATION/TRAINING PROGRAM

## 2017-05-18 PROCEDURE — 700102 HCHG RX REV CODE 250 W/ 637 OVERRIDE(OP): Performed by: STUDENT IN AN ORGANIZED HEALTH CARE EDUCATION/TRAINING PROGRAM

## 2017-05-18 PROCEDURE — 700102 HCHG RX REV CODE 250 W/ 637 OVERRIDE(OP): Performed by: HOSPITALIST

## 2017-05-18 RX ADMIN — LAMOTRIGINE 200 MG: 100 TABLET ORAL at 07:41

## 2017-05-18 RX ADMIN — ZIPRASIDONE HCL 20 MG: 20 CAPSULE ORAL at 20:54

## 2017-05-18 RX ADMIN — LORAZEPAM 1 MG: 1 TABLET ORAL at 15:10

## 2017-05-18 RX ADMIN — NICOTINE 21 MG: 21 PATCH, EXTENDED RELEASE TRANSDERMAL at 05:53

## 2017-05-18 RX ADMIN — ZIPRASIDONE HCL 20 MG: 20 CAPSULE ORAL at 07:40

## 2017-05-18 ASSESSMENT — ENCOUNTER SYMPTOMS
ABDOMINAL PAIN: 0
DIARRHEA: 0
CONSTIPATION: 0
SENSORY CHANGE: 0
PALPITATIONS: 0
SEIZURES: 0
DEPRESSION: 0
EYE PAIN: 0
TINGLING: 0
SPUTUM PRODUCTION: 0
CHILLS: 0
MYALGIAS: 0
COUGH: 0
MEMORY LOSS: 0
SHORTNESS OF BREATH: 0
PHOTOPHOBIA: 0
NAUSEA: 0
DOUBLE VISION: 0
DIZZINESS: 0
FOCAL WEAKNESS: 0
HEADACHES: 0
HEMOPTYSIS: 0
FEVER: 0
WHEEZING: 0
VOMITING: 0
NERVOUS/ANXIOUS: 0
INSOMNIA: 0
LOSS OF CONSCIOUSNESS: 0
HALLUCINATIONS: 0
WEIGHT LOSS: 0

## 2017-05-18 ASSESSMENT — PAIN SCALES - GENERAL: PAINLEVEL_OUTOF10: 0

## 2017-05-18 ASSESSMENT — LIFESTYLE VARIABLES: SUBSTANCE_ABUSE: 0

## 2017-05-18 NOTE — PROGRESS NOTES
[facility] [place] [facility] Medicine Interval Note    Name Miranda Sykes       1985   Age/Sex 32 y.o. female   MRN [id number]   Code Status FULL     After 5PM or if no immediate response to page, please call for cross-coverage  Attending/Team: Dr. Alcazar Call [phone] to page   1st Call - Day Intern (R1):   Dr. Rogers 2nd Call - Day Sr. Resident (R2/R3):   Dr. Gutierrez         Chief complaint/ reason for interval visit (Primary Diagnosis)   Suicide attempt by risperdal overdose    Interval Problem Daily Status Update    Active Problems:    Drug overdose, intentional POA: Unknown      Overview: - QTc has normalized.      - No CP, N/V, SOB, or palpitations today      - Stop daily EKGs      - Continue legal hold          Hypotension POA: Unknown      Overview: Stable and asymptomatic.       Ambulating without difficulty      Denies orthostasis    Schizophrenia (CMS-HCC) POA: Unknown      Overview: Started on Geodon       Denies delusions VH AH or SI currently.      Psychiatry following  Resolved Problems:    * No resolved hospital problems. *      Review of Systems   Constitutional: Negative for fever, chills, weight loss and malaise/fatigue.   HENT: Negative for hearing loss and tinnitus.    Eyes: Negative for double vision, photophobia and pain.   Respiratory: Negative for cough, hemoptysis, sputum production, shortness of breath and wheezing.    Cardiovascular: Negative for chest pain, palpitations and leg swelling.   Gastrointestinal: Negative for nausea, vomiting, abdominal pain, diarrhea and constipation.   Genitourinary: Negative for dysuria, urgency and frequency.   Musculoskeletal: Negative for myalgias.   Skin: Negative for itching and rash.   Neurological: Negative for dizziness, tingling, sensory change, focal weakness, seizures, loss of consciousness and headaches.   Psychiatric/Behavioral: Negative for depression, suicidal ideas, hallucinations, memory loss and substance abuse.  The patient is not nervous/anxious and does not have insomnia.        Consultants/Specialty  Psychiatry ([facility])    Disposition  Remain inpatient with 1:1 pending inpatient psychiatry transfer    Quality Measures  EKG reviewed, Medications reviewed, Radiology images reviewed and Labs reviewed  Duncan catheter: No Duncan      DVT Prophylaxis: Enoxaparin (Lovenox)    Ulcer prophylaxis: No    Assessed for rehab: Patient returned to prior level of function, rehabilitation not indicated at this time          Physical Exam       Filed Vitals:    05/17/17 1955 05/18/17 0400 05/18/17 0548 05/18/17 0800   BP: 112/75 84/69 103/71 109/60   Pulse: 85 87 95 73   Temp: 36.7 °C (98.1 °F) 36.7 °C (98 °F)  36.6 °C (97.8 °F)   Resp: 16 16  16   Height:       Weight:       SpO2: 97% 93%  96%     Body mass index is 24.34 kg/(m^2).    Oxygen Therapy:  Pulse Oximetry: 96 %, O2 (LPM): 0, O2 Delivery: None (Room Air)    Physical Exam   Constitutional: She is oriented to person, place, and time and well-developed, well-nourished, and in no distress. No distress.   HENT:   Head: Normocephalic and atraumatic.   Eyes: Conjunctivae and EOM are normal. Pupils are equal, round, and reactive to light. Left eye exhibits no discharge.   Neck: Normal range of motion. Neck supple. No JVD present. No tracheal deviation present. No thyromegaly present.   Cardiovascular: Normal rate, regular rhythm, normal heart sounds and intact distal pulses.  Exam reveals no gallop and no friction rub.    No murmur heard.  Pulmonary/Chest: Effort normal and breath sounds normal. No respiratory distress. She has no wheezes. She has no rales. She exhibits no tenderness.   Abdominal: Soft. Bowel sounds are normal. She exhibits no distension. There is no tenderness. There is no rebound and no guarding.   Musculoskeletal: Normal range of motion. She exhibits no edema or tenderness.   Neurological: She is alert and oriented to person, place, and time. She has normal  reflexes. No cranial nerve deficit. Coordination normal. GCS score is 15.   Skin: Skin is warm and dry. No rash noted. She is not diaphoretic. No erythema.   Psychiatric: Mood, memory, affect and judgment normal.   Nursing note and vitals reviewed.    No current suicidal or homicidal ideation or plan    Lab Data Review:      5/14/2017  12:40 PM    No results for input(s): SODIUM, POTASSIUM, CHLORIDE, CO2, BUN, CREATININE, MAGNESIUM, PHOSPHORUS, CALCIUM in the last 72 hours.    No results for input(s): ALTSGPT, ASTSGOT, ALKPHOSPHAT, TBILIRUBIN, DBILIRUBIN, GAMMAGT, AMYLASE, LIPASE, ALB, PREALBUMIN, GLUCOSE in the last 72 hours.    No results for input(s): RBC, HEMOGLOBIN, HEMATOCRIT, PLATELETCT, PROTHROMBTM, APTT, INR, IRON, FERRITIN, TOTIRONBC in the last 72 hours.              Assessment/Plan     Drug overdose, intentional  Assessment & Plan  Overdosed on risperdal in suicide attempt  Apparently during acute psychotic episode brought on by marijuana use.  Psychiatry ([facility]) has evaluated patient, has initiated geodon  QTc has since normalized from 523 to 409, rechecked EKG today after initiation of geodon  Continue 1:1  Will need inpatient psychiatry treatment  Now medically cleared for transfer, work with case management on transfer    Hypotension  Assessment & Plan  Transient, likely secondary to overdose with likely low baseline, asymptomatic  No further intervention necessary    Schizophrenia (CMS-[facility])  Assessment & Plan  Follows with outpatient psychiatry though is apparently noncompliant with her antipsychotics and admits to smoking marijuana which apparently exacerbates her psychosis  Psychiatry on board, appreciate the recommendations  Will require inpatient psychiatric treatment  Will defer treatment plan to psychiatry, now on geodon

## 2017-05-18 NOTE — PROGRESS NOTES
Assumed care, pt laying in bed awake alert and resp. No distress, no c/o pain. Appears in good spirits. Bed alarm on, call light in place, bed in lowest and locked position. q15min checks in place, will cont to monitor.

## 2017-05-18 NOTE — PROGRESS NOTES
Assumed care of patient after report from night shift RN. Patient continues on q 15 checks and legal hold. Patient compliant with care. Tolerates pills whole, but requests to change to crushed in applesauce tomorrow. Eagerly requesting discharge. Will continue to observe and provide care.

## 2017-05-18 NOTE — PROGRESS NOTES
[facility] [place] [facility] Interval Note    Name Miranda Sykes       1985   Age/Sex 32 y.o. female   MRN [id number]   Code Status FULL     After 5PM or if no immediate response to page, please call for cross-coverage  Attending/Team: Dr. Alcazar Call [phone] to page   1st Call - Day Intern (R1):   Dr. Rogers 2nd Call - Day Sr. Resident (R2/R3):   Dr. Gutierrez         Chief complaint/ reason for interval visit (Primary Diagnosis)   Suicide attempt by risperdal overdose    Interval Problem Daily Status Update    Active Problems:    Drug overdose, intentional POA: Unknown      Overview: - QTc has normalized.      - No CP, N/V, SOB, or palpitations today      - Stop daily EKGs      - Continue legal hold          Hypotension POA: Unknown      Overview: Stable and asymptomatic.       Ambulating without difficulty      Denies orthostasis    Schizophrenia (CMS-HCC) POA: Unknown      Overview: Started on Geodon       Denies delusions VH AH or SI currently.      Psychiatry following  Resolved Problems:    * No resolved hospital problems. *      Review of Systems   Constitutional: Negative for fever, chills, weight loss and malaise/fatigue.   HENT: Negative for hearing loss and tinnitus.    Eyes: Negative for double vision, photophobia and pain.   Respiratory: Negative for cough, hemoptysis, sputum production, shortness of breath and wheezing.    Cardiovascular: Negative for chest pain, palpitations and leg swelling.   Gastrointestinal: Negative for nausea, vomiting, abdominal pain, diarrhea and constipation.   Genitourinary: Negative for dysuria, urgency and frequency.   Musculoskeletal: Negative for myalgias.   Skin: Negative for itching and rash.   Neurological: Negative for dizziness, tingling, sensory change, focal weakness, seizures, loss of consciousness and headaches.   Psychiatric/Behavioral: Negative for depression, suicidal ideas, hallucinations, memory loss and substance abuse.  The patient is not nervous/anxious and does not have insomnia.        Consultants/Specialty  Psychiatry ([facility])    Disposition  Remain inpatient with 1:1 pending inpatient psychiatry transfer    Quality Measures  EKG reviewed, Medications reviewed, Radiology images reviewed and Labs reviewed  Duncan catheter: No Duncan      DVT Prophylaxis: Enoxaparin (Lovenox)    Ulcer prophylaxis: No    Assessed for rehab: Patient returned to prior level of function, rehabilitation not indicated at this time          Physical Exam       Filed Vitals:    05/17/17 0400 05/17/17 0707 05/17/17 1539 05/17/17 1955   BP: 108/56 109/69 110/71 112/75   Pulse: 62 64 98 85   Temp: 36.4 °C (97.5 °F) 36.5 °C (97.7 °F) 36.9 °C (98.4 °F) 36.7 °C (98.1 °F)   Resp: 18 17 18 16   Height:       Weight:       SpO2: 97% 97% 94% 97%     Body mass index is 24.34 kg/(m^2).    Oxygen Therapy:  Pulse Oximetry: 97 %, O2 (LPM): 0, O2 Delivery: None (Room Air)    Physical Exam   Constitutional: She is oriented to person, place, and time and well-developed, well-nourished, and in no distress. No distress.   HENT:   Head: Normocephalic and atraumatic.   Eyes: Conjunctivae and EOM are normal. Pupils are equal, round, and reactive to light. Left eye exhibits no discharge.   Neck: Normal range of motion. Neck supple. No JVD present. No tracheal deviation present. No thyromegaly present.   Cardiovascular: Normal rate, regular rhythm, normal heart sounds and intact distal pulses.  Exam reveals no gallop and no friction rub.    No murmur heard.  Pulmonary/Chest: Effort normal and breath sounds normal. No respiratory distress. She has no wheezes. She has no rales. She exhibits no tenderness.   Abdominal: Soft. Bowel sounds are normal. She exhibits no distension. There is no tenderness. There is no rebound and no guarding.   Musculoskeletal: Normal range of motion. She exhibits no edema or tenderness.   Neurological: She is alert and oriented to person, place, and time.  She has normal reflexes. No cranial nerve deficit. Coordination normal. GCS score is 15.   Skin: Skin is warm and dry. No rash noted. She is not diaphoretic. No erythema.   Psychiatric: Mood, memory, affect and judgment normal.     No current suicidal or homicidal ideation or plan    Lab Data Review:      5/14/2017  12:40 PM    No results for input(s): SODIUM, POTASSIUM, CHLORIDE, CO2, BUN, CREATININE, MAGNESIUM, PHOSPHORUS, CALCIUM in the last 72 hours.    No results for input(s): ALTSGPT, ASTSGOT, ALKPHOSPHAT, TBILIRUBIN, DBILIRUBIN, GAMMAGT, AMYLASE, LIPASE, ALB, PREALBUMIN, GLUCOSE in the last 72 hours.    No results for input(s): RBC, HEMOGLOBIN, HEMATOCRIT, PLATELETCT, PROTHROMBTM, APTT, INR, IRON, FERRITIN, TOTIRONBC in the last 72 hours.              Assessment/Plan     Drug overdose, intentional  Assessment & Plan  Overdosed on risperdal in suicide attempt  Apparently during acute psychotic episode brought on by marijuana use.  Psychiatry ([facility]) has evaluated patient, will hold antipsychotics at this time  QTc has since normalized from 523 on admission to now 409  Continue 1:1  Will need inpatient psychiatry treatment  Now medically cleared for transfer, work with case management on transfer    Hypotension  Assessment & Plan  Transient, likely secondary to overdose  Now normalized, asymptomatic  No further intervention necessary    Schizophrenia (CMS-[facility])  Assessment & Plan  Follows with outpatient psychiatry though is apparently noncompliant with her antipsychotics and admits to smoking marijuana which apparently exacerbates her psychosis  Psychiatry on board, appreciate the recommendations  Will require inpatient psychiatric treatment  Will defer treatment plan to psychiatry, if antipsychotics are restarted

## 2017-05-18 NOTE — CARE PLAN
Problem: Safety  Goal: Will remain free from falls  Intervention: Assess risk factors for falls  Bed locked and in the lowest position, call light in reach  Intervention: Implement fall precautions  Patient up ad angela      Problem: Infection  Goal: Will remain free from infection  Outcome: PROGRESSING AS EXPECTED

## 2017-05-19 PROCEDURE — A9270 NON-COVERED ITEM OR SERVICE: HCPCS | Performed by: STUDENT IN AN ORGANIZED HEALTH CARE EDUCATION/TRAINING PROGRAM

## 2017-05-19 PROCEDURE — 770006 HCHG ROOM/CARE - MED/SURG/GYN SEMI*

## 2017-05-19 PROCEDURE — 700102 HCHG RX REV CODE 250 W/ 637 OVERRIDE(OP): Performed by: STUDENT IN AN ORGANIZED HEALTH CARE EDUCATION/TRAINING PROGRAM

## 2017-05-19 PROCEDURE — A9270 NON-COVERED ITEM OR SERVICE: HCPCS | Performed by: PSYCHIATRY & NEUROLOGY

## 2017-05-19 PROCEDURE — 700102 HCHG RX REV CODE 250 W/ 637 OVERRIDE(OP): Performed by: HOSPITALIST

## 2017-05-19 PROCEDURE — A9270 NON-COVERED ITEM OR SERVICE: HCPCS | Performed by: HOSPITALIST

## 2017-05-19 PROCEDURE — 700102 HCHG RX REV CODE 250 W/ 637 OVERRIDE(OP): Performed by: PSYCHIATRY & NEUROLOGY

## 2017-05-19 RX ORDER — HALOPERIDOL 5 MG/ML
5 INJECTION INTRAMUSCULAR
Status: COMPLETED | OUTPATIENT
Start: 2017-05-19 | End: 2017-05-19

## 2017-05-19 RX ORDER — LORAZEPAM 2 MG/ML
2 INJECTION INTRAMUSCULAR ONCE
Status: COMPLETED | OUTPATIENT
Start: 2017-05-19 | End: 2017-05-19

## 2017-05-19 RX ORDER — DIPHENHYDRAMINE HYDROCHLORIDE 50 MG/ML
50 INJECTION INTRAMUSCULAR; INTRAVENOUS ONCE
Status: COMPLETED | OUTPATIENT
Start: 2017-05-19 | End: 2017-05-19

## 2017-05-19 RX ADMIN — NICOTINE 21 MG: 21 PATCH, EXTENDED RELEASE TRANSDERMAL at 05:17

## 2017-05-19 RX ADMIN — LAMOTRIGINE 200 MG: 100 TABLET ORAL at 08:14

## 2017-05-19 RX ADMIN — ZIPRASIDONE HCL 20 MG: 20 CAPSULE ORAL at 08:14

## 2017-05-19 RX ADMIN — LORAZEPAM 1 MG: 1 TABLET ORAL at 08:15

## 2017-05-19 RX ADMIN — LORAZEPAM 1 MG: 1 TABLET ORAL at 21:09

## 2017-05-19 RX ADMIN — ZIPRASIDONE HCL 20 MG: 20 CAPSULE ORAL at 19:51

## 2017-05-19 ASSESSMENT — ENCOUNTER SYMPTOMS
DOUBLE VISION: 0
CHILLS: 0
FOCAL WEAKNESS: 0
SENSORY CHANGE: 0
SHORTNESS OF BREATH: 0
VOMITING: 0
COUGH: 0
HEMOPTYSIS: 0
SEIZURES: 0
EYE PAIN: 0
DEPRESSION: 0
DIARRHEA: 0
HEADACHES: 0
NERVOUS/ANXIOUS: 0
MEMORY LOSS: 0
WEIGHT LOSS: 0
ABDOMINAL PAIN: 0
HALLUCINATIONS: 0
WHEEZING: 0
TINGLING: 0
LOSS OF CONSCIOUSNESS: 0
MYALGIAS: 0
SPUTUM PRODUCTION: 0
INSOMNIA: 0
PALPITATIONS: 0
FEVER: 0
CONSTIPATION: 0
PHOTOPHOBIA: 0
NAUSEA: 0
DIZZINESS: 0

## 2017-05-19 ASSESSMENT — PAIN SCALES - GENERAL
PAINLEVEL_OUTOF10: 0
PAINLEVEL_OUTOF10: 0

## 2017-05-19 ASSESSMENT — LIFESTYLE VARIABLES: SUBSTANCE_ABUSE: 0

## 2017-05-19 NOTE — PROGRESS NOTES
"Assumed care of patient after report from night shift RN. Patient continues on q 15 checks and legal hold. Patient compliant with care. Tolerates pills crushed in applesauce. Eagerly requesting discharge. Will continue to observe and provide care. Occasional outbursts about \"not being on legal hold\" and comments of the \"do you want to be sued?\" variety. Security on alert.  "

## 2017-05-19 NOTE — PROGRESS NOTES
[facility] [place] Internal Medicine Interval Note    Name Miranda Sykes       1985   Age/Sex 32 y.o. female   MRN [id number]   Code Status FULL     After 5PM or if no immediate response to page, please call for cross-coverage  Attending/Team: Dr. Alcazar Call [phone] to page   1st Call - Day Intern (R1):   Dr. Rogers 2nd Call - Day Sr. Resident (R2/R3):   Dr. Gutierrez         Chief complaint/ reason for interval visit (Primary Diagnosis)   Suicide attempt by risperdal overdose    Interval Problem Daily Status Update    Active Problems:    Drug overdose, intentional POA: Unknown      Overview: - QTc has normalized.      - No CP, N/V, SOB, or palpitations today      - Stop daily EKGs      - Continue legal hold          Hypotension POA: Unknown      Overview: Stable and asymptomatic.       Ambulating without difficulty      Denies orthostasis    Schizophrenia (CMS-HCC) POA: Unknown      Overview: Started on Geodon       Denies delusions VH AH or SI currently.      Psychiatry following  Resolved Problems:    * No resolved hospital problems. *      Review of Systems   Constitutional: Negative for fever, chills, weight loss and malaise/fatigue.   HENT: Negative for hearing loss and tinnitus.    Eyes: Negative for double vision, photophobia and pain.   Respiratory: Negative for cough, hemoptysis, sputum production, shortness of breath and wheezing.    Cardiovascular: Negative for chest pain, palpitations and leg swelling.   Gastrointestinal: Negative for nausea, vomiting, abdominal pain, diarrhea and constipation.   Genitourinary: Negative for dysuria, urgency and frequency.   Musculoskeletal: Negative for myalgias.   Skin: Negative for itching and rash.   Neurological: Negative for dizziness, tingling, sensory change, focal weakness, seizures, loss of consciousness and headaches.   Psychiatric/Behavioral: Negative for depression, suicidal ideas, hallucinations, memory loss and substance abuse.  The patient is not nervous/anxious and does not have insomnia.        Consultants/Specialty  Psychiatry ([facility])    Disposition  Remain inpatient with 1:1 pending inpatient psychiatry transfer    Quality Measures  EKG reviewed, Medications reviewed, Radiology images reviewed and Labs reviewed  Duncan catheter: No Duncan      DVT Prophylaxis: Enoxaparin (Lovenox)    Ulcer prophylaxis: No    Assessed for rehab: Patient returned to prior level of function, rehabilitation not indicated at this time          Physical Exam       Filed Vitals:    05/18/17 1500 05/18/17 1905 05/19/17 0435 05/19/17 0710   BP: 111/70 120/83 98/50 117/76   Pulse: 83 100 86 83   Temp: 36.7 °C (98 °F) 37.2 °C (99 °F) 36.3 °C (97.4 °F) 36.8 °C (98.3 °F)   Resp: 18 18 18 16   Height:       Weight:       SpO2: 97% 98% 97% 95%     Body mass index is 24.34 kg/(m^2).    Oxygen Therapy:  Pulse Oximetry: 95 %, O2 (LPM): 0, O2 Delivery: None (Room Air)    Physical Exam   Constitutional: She is oriented to person, place, and time and well-developed, well-nourished, and in no distress. No distress.   HENT:   Head: Normocephalic and atraumatic.   Eyes: Conjunctivae and EOM are normal. Pupils are equal, round, and reactive to light. Left eye exhibits no discharge.   Neck: Normal range of motion. Neck supple. No JVD present. No tracheal deviation present. No thyromegaly present.   Cardiovascular: Normal rate, regular rhythm, normal heart sounds and intact distal pulses.  Exam reveals no gallop and no friction rub.    No murmur heard.  Pulmonary/Chest: Effort normal and breath sounds normal. No respiratory distress. She has no wheezes. She has no rales. She exhibits no tenderness.   Abdominal: Soft. Bowel sounds are normal. She exhibits no distension. There is no tenderness. There is no rebound and no guarding.   Musculoskeletal: Normal range of motion. She exhibits no edema or tenderness.   Neurological: She is alert and oriented to person, place, and time.  She has normal reflexes. No cranial nerve deficit. Coordination normal. GCS score is 15.   Skin: Skin is warm and dry. No rash noted. She is not diaphoretic. No erythema.   Psychiatric: Mood, memory, affect and judgment normal.   Nursing note and vitals reviewed.    No current suicidal or homicidal ideation or plan    Lab Data Review:      5/14/2017  12:40 PM    No results for input(s): SODIUM, POTASSIUM, CHLORIDE, CO2, BUN, CREATININE, MAGNESIUM, PHOSPHORUS, CALCIUM in the last 72 hours.    No results for input(s): ALTSGPT, ASTSGOT, ALKPHOSPHAT, TBILIRUBIN, DBILIRUBIN, GAMMAGT, AMYLASE, LIPASE, ALB, PREALBUMIN, GLUCOSE in the last 72 hours.    No results for input(s): RBC, HEMOGLOBIN, HEMATOCRIT, PLATELETCT, PROTHROMBTM, APTT, INR, IRON, FERRITIN, TOTIRONBC in the last 72 hours.              Assessment/Plan     Drug overdose, intentional  Assessment & Plan  Overdosed on risperdal in suicide attempt  Apparently during acute psychotic episode brought on by marijuana use.  Psychiatry ([facility]) has evaluated patient, has initiated geodon  QTc has since normalized from 523 to 409, rechecked EKG after initiation of geodon showed QTc of 413  Continue 1:1  Will need inpatient psychiatry treatment  Now medically cleared for transfer, work with case management on transfer    Hypotension  Assessment & Plan  Transient, likely secondary to overdose with likely low baseline, asymptomatic  No further intervention necessary    Schizophrenia (CMS-[facility])  Assessment & Plan  Follows with outpatient psychiatry though is apparently noncompliant with her antipsychotics and admits to smoking marijuana which apparently exacerbates her psychosis  Psychiatry on board, appreciate the recommendations  Will require inpatient psychiatric treatment  Will defer treatment plan to psychiatry, now on geodon

## 2017-05-19 NOTE — CARE PLAN
Problem: Safety  Goal: Will remain free from injury  Outcome: PROGRESSING AS EXPECTED  Bed in low and locked position. Treaded socks on. 1:1 sitter.     Problem: Knowledge Deficit  Goal: Knowledge of disease process/condition, treatment plan, diagnostic tests, and medications will improve  Outcome: PROGRESSING AS EXPECTED  Plan of care discussed with the patient.     Problem: Safety:  Goal: Will remain free from injury  Outcome: PROGRESSING AS EXPECTED  Bed in low and locked position. Treaded socks on. 1:1 sitter.

## 2017-05-19 NOTE — PROGRESS NOTES
Received report and assumed care of the patient. Patient is alert and oriented x4. Patient is a legal hold.  1:1 sitter at the bedside. Treaded socks on.     Patient is refusing a bed alarm. Gait is steady when up. Educated on the use of an alarm for her safety from falls and she is still refusing. All other safety precautions are in place.

## 2017-05-20 PROCEDURE — A9270 NON-COVERED ITEM OR SERVICE: HCPCS | Performed by: PSYCHIATRY & NEUROLOGY

## 2017-05-20 PROCEDURE — 770006 HCHG ROOM/CARE - MED/SURG/GYN SEMI*

## 2017-05-20 PROCEDURE — 700102 HCHG RX REV CODE 250 W/ 637 OVERRIDE(OP): Performed by: PSYCHIATRY & NEUROLOGY

## 2017-05-20 PROCEDURE — A9270 NON-COVERED ITEM OR SERVICE: HCPCS | Performed by: HOSPITALIST

## 2017-05-20 PROCEDURE — 700102 HCHG RX REV CODE 250 W/ 637 OVERRIDE(OP): Performed by: HOSPITALIST

## 2017-05-20 PROCEDURE — 700111 HCHG RX REV CODE 636 W/ 250 OVERRIDE (IP): Performed by: STUDENT IN AN ORGANIZED HEALTH CARE EDUCATION/TRAINING PROGRAM

## 2017-05-20 PROCEDURE — 99231 SBSQ HOSP IP/OBS SF/LOW 25: CPT | Mod: GC | Performed by: INTERNAL MEDICINE

## 2017-05-20 RX ADMIN — ZIPRASIDONE HCL 20 MG: 20 CAPSULE ORAL at 19:53

## 2017-05-20 RX ADMIN — LORAZEPAM 1 MG: 1 TABLET ORAL at 22:20

## 2017-05-20 RX ADMIN — LAMOTRIGINE 200 MG: 100 TABLET ORAL at 09:13

## 2017-05-20 RX ADMIN — ZIPRASIDONE HCL 20 MG: 20 CAPSULE ORAL at 09:13

## 2017-05-20 RX ADMIN — ENOXAPARIN SODIUM 40 MG: 100 INJECTION SUBCUTANEOUS at 09:13

## 2017-05-20 ASSESSMENT — ENCOUNTER SYMPTOMS
NERVOUS/ANXIOUS: 1
FOCAL WEAKNESS: 0
NAUSEA: 0
SHORTNESS OF BREATH: 0
CHILLS: 0
INSOMNIA: 0
MYALGIAS: 0
DIZZINESS: 0
DEPRESSION: 0
FEVER: 0
HALLUCINATIONS: 0
HEADACHES: 0
TINGLING: 0
SENSORY CHANGE: 0

## 2017-05-20 ASSESSMENT — PAIN SCALES - GENERAL
PAINLEVEL_OUTOF10: 0
PAINLEVEL_OUTOF10: 0

## 2017-05-20 ASSESSMENT — LIFESTYLE VARIABLES: DO YOU DRINK ALCOHOL: NO

## 2017-05-20 NOTE — CARE PLAN
Problem: Safety  Goal: Will remain free from injury  Outcome: PROGRESSING AS EXPECTED  Continues to be on legal hold. 1:1 sitter at bedside. Safety precautions in place. Checked room for dangerous objects, plastic utensils only. Bed placed in low position, room near nursing station. Hourly rounding done.     Problem: Safety:  Goal: Will remain free from injury  Outcome: PROGRESSING AS EXPECTED  Continues to be on legal hold. 1:1 sitter at bedside. Safety precautions in place. Checked room for dangerous objects, plastic utensils only. Bed placed in low position, room near nursing station. Hourly rounding done.     Problem: Psychosocial Needs:  Goal: Level of anxiety will decrease  Outcome: PROGRESSING AS EXPECTED  Pt starting to get anxious. Provided pt education regarding. Medicated appropriately per MAR. Will continue to monitor.

## 2017-05-20 NOTE — PROGRESS NOTES
"Assisted with verbal deescalation of pt, who was agitated and verbally aggressive.  Initially, sitter approached me asking what number she should call to get security, as she was feeling threatened.  Pt escalated and security called.  Pt pacing, verbally aggressive to RNs, demanding to be discharged, posturing and loud. (Pt on legal hold til 5/24/17 per bedside RN Melina.)  Pt accusing staff of keeping her against her will and demanding to see .  Pt approached  sitting with another pt; officer needed to strongly redirect her to get away from the room as he was occupied monitoring a prisoner.  Pt fixated on copy of patients rights from medicaid paperwork, repeating that her rights were being infringed upon and that the paper was what allows her to leave.  Pt swearing at staff, telling them to \"fuck off\" and calling them liars.  Several attempts made to explain to pt that she is on a legal hold and cannot leave.  Pt cursed and stated staff should be on a legal hold.  After no call back from psych page, hospitalist contacted for PRN IM meds for agitation.  Once RN came in room with security and IM injection, pt quieted and stated she could calm herself down and remain calm.  Sitter informed if she feels threatened in the least she should call security again.  Monitor pt carefully for escalating agitation and potential for violence.  "

## 2017-05-20 NOTE — PROGRESS NOTES
Received report from day shift RN. Discussed POC with pt., verbalized understanding, assumed care @1915. A&Ox4. Anxious, pt seems to be a bit confused about medications. Provided education. On room air. Denies pain. Continues to be on legal hold. 1:1 sitter at bedside. Up self, steady. Safety precautions in place; treaded socks on, call light within reach, personal belongings within reach, upper bed rails up.

## 2017-05-20 NOTE — PROGRESS NOTES
[facility] [place] [facility] Interval Note    Name Miranda Sykes       1985   Age/Sex 32 y.o. female   MRN [id number]   Code Status FULL     After 5PM or if no immediate response to page, please call for cross-coverage  Attending/Team: Dr. Alcazar Call [phone] to page   1st Call - Day Intern (R1):   Dr. Rogers 2nd Call - Day Sr. Resident (R2/R3):   Dr. Gutierrez         Chief complaint/ reason for interval visit (Primary Diagnosis)   Suicide attempt by risperdal overdose    Interval Problem Daily Status Update    Active Problems:    Drug overdose, intentional POA: Unknown      Overview: - QTc has normalized.      - No CP, N/V, SOB, or palpitations today      - Stop daily EKGs      - Continue legal hold          Hypotension POA: Unknown      Overview: Stable and asymptomatic.       Ambulating without difficulty      Denies orthostasis    Schizophrenia (CMS-HCC) POA: Unknown      Overview: Overnight was aggressive. PRN sedatives and haldol added to the chart.       Spoke with psych. They will re-evaluate on Monday.    Resolved Problems:    * No resolved hospital problems. *      Review of Systems   Constitutional: Negative for fever and chills.   Respiratory: Negative for shortness of breath.    Cardiovascular: Negative for chest pain.   Gastrointestinal: Negative for nausea.   Genitourinary: Negative for dysuria.   Musculoskeletal: Negative for myalgias.   Skin: Negative for rash.   Neurological: Negative for dizziness, tingling, sensory change, focal weakness and headaches.   Psychiatric/Behavioral: Negative for depression, suicidal ideas and hallucinations. The patient is nervous/anxious. The patient does not have insomnia.        Consultants/Specialty  Psychiatry ([facility])    Disposition  Remain inpatient with 1:1 pending inpatient psychiatry transfer    Quality Measures  EKG reviewed, Medications reviewed, Radiology images reviewed and Labs reviewed  Duncan catheter: No  Perla      DVT Prophylaxis: Enoxaparin (Lovenox)    Ulcer prophylaxis: No    Assessed for rehab: Patient returned to prior level of function, rehabilitation not indicated at this time          Physical Exam       Filed Vitals:    05/19/17 1649 05/19/17 1915 05/20/17 0610 05/20/17 0715   BP: 108/71 121/85 121/80 115/75   Pulse: 92 93 79 67   Temp: 36.6 °C (97.8 °F) 36.3 °C (97.3 °F) 36.1 °C (96.9 °F) 36.7 °C (98 °F)   Resp: 16 16 15 19   Height:       Weight:       SpO2: 95% 98% 97% 99%     Body mass index is 24.34 kg/(m^2).    Oxygen Therapy:  Pulse Oximetry: 99 %, O2 (LPM): 0, O2 Delivery: None (Room Air)    Physical Exam   Constitutional: She is oriented to person, place, and time and well-developed, well-nourished, and in no distress. No distress.   HENT:   Head: Normocephalic and atraumatic.   Neck: Normal range of motion. Neck supple.   Cardiovascular: Normal rate and regular rhythm.    Pulmonary/Chest: Effort normal and breath sounds normal. No respiratory distress. She has no wheezes.   Abdominal: Bowel sounds are normal. She exhibits no distension.   Musculoskeletal: Normal range of motion. She exhibits no edema.   Neurological: She is alert and oriented to person, place, and time. She has normal reflexes. GCS score is 15.   Skin: Skin is warm and dry. She is not diaphoretic.   Psychiatric:   Irritable. Anxious. Rapid speech.    Nursing note and vitals reviewed.    No current suicidal or homicidal ideation or plan    Lab Data Review:      5/14/2017  12:40 PM    No results for input(s): SODIUM, POTASSIUM, CHLORIDE, CO2, BUN, CREATININE, MAGNESIUM, PHOSPHORUS, CALCIUM in the last 72 hours.    No results for input(s): ALTSGPT, ASTSGOT, ALKPHOSPHAT, TBILIRUBIN, DBILIRUBIN, GAMMAGT, AMYLASE, LIPASE, ALB, PREALBUMIN, GLUCOSE in the last 72 hours.    No results for input(s): RBC, HEMOGLOBIN, HEMATOCRIT, PLATELETCT, PROTHROMBTM, APTT, INR, IRON, FERRITIN, TOTIRONBC in the last 72 hours.              Assessment/Plan      Drug overdose, intentional  Assessment & Plan  Overdosed on risperdal in suicide attempt  Apparently during acute psychotic episode brought on by marijuana use.  Psychiatry ([facility]) has evaluated patient, has initiated geodon  QTc has since normalized from 523 to 409, rechecked EKG after initiation of geodon showed QTc of 413  Continue 1:1  Will need inpatient psychiatry treatment  Now medically cleared for transfer, work with case management on transfer    Hypotension  Assessment & Plan  Transient, likely secondary to overdose with likely low baseline, asymptomatic  No further intervention necessary    Schizophrenia (CMS-[facility])  Assessment & Plan  Follows with outpatient psychiatry though is apparently noncompliant with her antipsychotics and admits to smoking marijuana which apparently exacerbates her psychosis  Psychiatry on board, appreciate the recommendations  PRN ativan and haldol on board.

## 2017-05-20 NOTE — PROGRESS NOTES
Increasingly erratic behavior throughout day. Alternately hostile and agreeable. Increasingly labile. Insists that she has not been given any medication today. Attempted to be physically intimidating to me by invading my personal space and using foul language and an aggressive manner. Will continue to observe and provide care. Continues to refuse ativan despite extensive education.

## 2017-05-20 NOTE — PROGRESS NOTES
Day end update -Around 11 am this morning, patient began to become more agitated. Her morning ativan was seemingly wearing off. She began complaining about her rights and demanding to be released and discharged. She also began to lock herself in the restroom repeatedly despite extensive education about safety and hospital protocol. She started to demand her clothes around noon, and SW began to call for the attending psych . I escorted patient to the charge nurse station several times to attempt to get her various questions answered in an attempt to placate her. By the middle of the afternoon, she was attempting to intimidate me by using vulgarity and invading my personal space. Eventually I had to call security for assistance. SW reportedly called the attending psych  several times. The patient was at times in the villafana, swearing loudly and threatening staff with lawsuits and [facility] time. The charge nurse, Krupa, spent quite a long time with the patient trying to help her regain control of herself. The patient continued to be abusive towards all staff members. She accosted a  stationed down the villafana, and he had to be very verbally forceful to extricate himself. I paged the attending psych  And got an order for 50mg IM of haldol, 2mg IM of ativan and 50 IM of benadryl. I attempted a show of force with 2 security officers and explained that I was going to forcefully give her shots to help her calm down and be safe. I offered to not give her the shot if she could calm down and control herself. She immediately agreed and hopped into bed. I reminded her that the shot and the officers were going to stay available for her safety if needed. Will continue to observe and provide care.

## 2017-05-21 PROBLEM — I95.9 HYPOTENSION: Status: RESOLVED | Noted: 2017-05-13 | Resolved: 2017-05-21

## 2017-05-21 PROCEDURE — 770006 HCHG ROOM/CARE - MED/SURG/GYN SEMI*

## 2017-05-21 PROCEDURE — A9270 NON-COVERED ITEM OR SERVICE: HCPCS | Performed by: STUDENT IN AN ORGANIZED HEALTH CARE EDUCATION/TRAINING PROGRAM

## 2017-05-21 PROCEDURE — A9270 NON-COVERED ITEM OR SERVICE: HCPCS | Performed by: PSYCHIATRY & NEUROLOGY

## 2017-05-21 PROCEDURE — 700102 HCHG RX REV CODE 250 W/ 637 OVERRIDE(OP): Performed by: PSYCHIATRY & NEUROLOGY

## 2017-05-21 PROCEDURE — 700102 HCHG RX REV CODE 250 W/ 637 OVERRIDE(OP): Performed by: STUDENT IN AN ORGANIZED HEALTH CARE EDUCATION/TRAINING PROGRAM

## 2017-05-21 PROCEDURE — 700102 HCHG RX REV CODE 250 W/ 637 OVERRIDE(OP): Performed by: HOSPITALIST

## 2017-05-21 PROCEDURE — 700111 HCHG RX REV CODE 636 W/ 250 OVERRIDE (IP): Performed by: STUDENT IN AN ORGANIZED HEALTH CARE EDUCATION/TRAINING PROGRAM

## 2017-05-21 PROCEDURE — A9270 NON-COVERED ITEM OR SERVICE: HCPCS | Performed by: HOSPITALIST

## 2017-05-21 PROCEDURE — 99231 SBSQ HOSP IP/OBS SF/LOW 25: CPT | Mod: GC | Performed by: INTERNAL MEDICINE

## 2017-05-21 RX ADMIN — ENOXAPARIN SODIUM 40 MG: 100 INJECTION SUBCUTANEOUS at 08:01

## 2017-05-21 RX ADMIN — NICOTINE 21 MG: 21 PATCH, EXTENDED RELEASE TRANSDERMAL at 05:43

## 2017-05-21 RX ADMIN — ZIPRASIDONE HCL 20 MG: 20 CAPSULE ORAL at 20:23

## 2017-05-21 RX ADMIN — ZIPRASIDONE HCL 20 MG: 20 CAPSULE ORAL at 08:01

## 2017-05-21 RX ADMIN — LORAZEPAM 1 MG: 1 TABLET ORAL at 21:48

## 2017-05-21 RX ADMIN — LAMOTRIGINE 200 MG: 100 TABLET ORAL at 08:01

## 2017-05-21 ASSESSMENT — ENCOUNTER SYMPTOMS
TINGLING: 0
MYALGIAS: 0
SHORTNESS OF BREATH: 0
HEADACHES: 0
FOCAL WEAKNESS: 0
DEPRESSION: 0
HALLUCINATIONS: 0
DIZZINESS: 0
FEVER: 0
INSOMNIA: 0
NAUSEA: 0
NERVOUS/ANXIOUS: 1
CHILLS: 0
SENSORY CHANGE: 0

## 2017-05-21 ASSESSMENT — PAIN SCALES - GENERAL
PAINLEVEL_OUTOF10: 2
PAINLEVEL_OUTOF10: 0

## 2017-05-21 NOTE — DISCHARGE PLANNING
Medical Social Work  Patient is medically cleared.  Faxed Legal Hold p/w and most recent court order to [facility] for referrals to local M/H facilities.

## 2017-05-21 NOTE — CARE PLAN
Problem: Safety  Goal: Will remain free from injury  Outcome: PROGRESSING AS EXPECTED  Dangerous objects removed from room, 1:1 sitter in place.     Problem: Safety:  Goal: Will remain free from injury  Outcome: PROGRESSING AS EXPECTED  Dangerous objects removed from room, 1:1 sitter in place.     Problem: Psychosocial Needs:  Goal: Level of anxiety will decrease  Outcome: PROGRESSING AS EXPECTED  Administered Lorazepam for anxiety/agression.

## 2017-05-21 NOTE — PROGRESS NOTES
Pt. Resting quietly in bed, denies pain and currently verbalizes no needs at this time.  1:1 sitter present.  Pt. Has no IV at this time.  Pt. Up in room with no assistance needed, but she is made aware to verbalize to her sitter if she needs anything.

## 2017-05-21 NOTE — PROGRESS NOTES
Patient AO x 4 and somewhat agitated, she is distrusful of me and made odd statements regarding food absorbing into her body in a bizarre way. She denies any pain and has no complaints at this time. 1:1 sitter present. Patient refused senna 2 tabs but took prescribed Geodon see MAR. Hourly rounding in place.       Patient had her call light and several other cords in the room. I removed them all.

## 2017-05-21 NOTE — PROGRESS NOTES
[facility] [place] [facility] ATTENDING NOTE:      Date & Time note created:   5/21/2017   11:58 AM     Visit Time:   Attending/resident bedside rounds 9-11:30 AM     The patient was evaluated with the resident staff.  I reviewed the resident's note and agree with the resident's findings and plan as documented in the resident's note except as documented in the attending note. Please reference resident daily note for complete information.The chart was reviewed and summarized.  Available labs, imaging, O2 sats, EKGs were reviewed. Available nursing, consultant, and resident notes were reviewed. I am actively involved in the patient's care.                                                                BRIEF DISCUSSION:                                                         // Suicidal attempt by drug overdose: Had QT prolongation on presentation. Blames it to delusions. Needs control of her Psychiatric illness.    // Schizoaffective Disorder, Bipolar Type: Psych adjusting medications , likely needs inpatient psych admission. Multiple Psych admissions in the past. Works in [place] and would want to return to [place].    // Hypotension: Resolved.  ----------------------------------------------------------------------------------------------------------------------  Filed Vitals:    05/20/17 1650 05/20/17 1926 05/21/17 0600 05/21/17 0802   BP: 111/65 129/79  114/75   Pulse: 94 91  103   Temp: 36.9 °C (98.5 °F) 36.7 °C (98.1 °F)  36.9 °C (98.4 °F)   Resp: 17 16  16   Height:       Weight:   56.8 kg (125 lb 3.5 oz)    SpO2: 95% 94%  96%     Weight/BMI: Body mass index is 23.67 kg/(m^2).  Pulse Oximetry: 96 %, O2 (LPM): 0, O2 Delivery: None (Room Air)  No intake or output data in the 24 hours ending 05/21/17 1158    Ben Alcazar MD   [facility] Hospitalist

## 2017-05-21 NOTE — PROGRESS NOTES
"Patient given ativan 1mg PO at 2020,    At 2040 patient became agitated with staff saying, \"I'll punch you in the fucking face\" and making other threats. Patient is also saying that she can leave and that the legal hold is over. Security was called and the charge nurse notified, Charge nurse Mary is talking to patient  Who calmed down and security was called off.   "

## 2017-05-21 NOTE — PROGRESS NOTES
[facility] [place] [facility]val [place]    Name Miranda Sykes       1985   Age/Sex 32 y.o. female   MRN [id number]   Code Status FULL     After 5PM or if no immediate response to page, please call for cross-coverage  Attending/Team: Dr. Alcazar Call [phone] to page   1st Call - Day Intern (R1):   Dr. Rogers 2nd Call - Day Sr. Resident (R2/R3):   Dr. Gutierrez         Chief complaint/ reason for interval visit (Primary Diagnosis)   Suicide attempt by risperdal overdose    Interval Problem Daily Status Update    Active Problems:    Drug overdose, intentional POA: Unknown      Overview:       - Continue legal hold          Schizophrenia (CMS-[facility]) POA: Unknown      Overview: Overnight was aggressive. PRN sedatives and haldol added to the chart.       Spoke with psych. They will re-evaluate on Monday.    Resolved Problems:    Hypotension POA: Unknown      Overview: Stable and asymptomatic.       Ambulating without difficulty      Denies orthostasis      Review of Systems   Constitutional: Negative for fever and chills.   Respiratory: Negative for shortness of breath.    Cardiovascular: Negative for chest pain.   Gastrointestinal: Negative for nausea.   Genitourinary: Negative for dysuria.   Musculoskeletal: Negative for myalgias.   Skin: Negative for rash.   Neurological: Negative for dizziness, tingling, sensory change, focal weakness and headaches.   Psychiatric/Behavioral: Negative for depression, suicidal ideas and hallucinations. The patient is nervous/anxious. The patient does not have insomnia.        Consultants/Specialty  Psychiatry ([facility])    Disposition  Remain inpatient with 1:1 pending inpatient psychiatry transfer    Quality Measures  EKG reviewed, Medications reviewed, Radiology images reviewed and Labs reviewed  Duncan catheter: No Duncan      DVT Prophylaxis: Enoxaparin (Lovenox)    Ulcer prophylaxis: No    Assessed for rehab: Patient returned to prior level of  function, rehabilitation not indicated at this time          Physical Exam       Filed Vitals:    05/20/17 1650 05/20/17 1926 05/21/17 0600 05/21/17 0802   BP: 111/65 129/79  114/75   Pulse: 94 91  103   Temp: 36.9 °C (98.5 °F) 36.7 °C (98.1 °F)  36.9 °C (98.4 °F)   Resp: 17 16  16   Height:       Weight:   56.8 kg (125 lb 3.5 oz)    SpO2: 95% 94%  96%     Body mass index is 23.67 kg/(m^2). Weight: 56.8 kg (125 lb 3.5 oz)  Oxygen Therapy:  Pulse Oximetry: 96 %, O2 (LPM): 0, O2 Delivery: None (Room Air)    Physical Exam   Constitutional: She is oriented to person, place, and time and well-developed, well-nourished, and in no distress. No distress.   HENT:   Head: Normocephalic and atraumatic.   Neck: Normal range of motion. Neck supple.   Cardiovascular: Normal rate and regular rhythm.    Pulmonary/Chest: Effort normal and breath sounds normal. No respiratory distress. She has no wheezes.   Abdominal: Bowel sounds are normal. She exhibits no distension.   Musculoskeletal: Normal range of motion. She exhibits no edema.   Neurological: She is alert and oriented to person, place, and time. She has normal reflexes. GCS score is 15.   Skin: Skin is warm and dry. She is not diaphoretic.   Psychiatric:   Irritable. Anxious. Rapid speech.    Nursing note and vitals reviewed.    No current suicidal or homicidal ideation or plan    Lab Data Review:      5/14/2017  12:40 PM    No results for input(s): SODIUM, POTASSIUM, CHLORIDE, CO2, BUN, CREATININE, MAGNESIUM, PHOSPHORUS, CALCIUM in the last 72 hours.    No results for input(s): ALTSGPT, ASTSGOT, ALKPHOSPHAT, TBILIRUBIN, DBILIRUBIN, GAMMAGT, AMYLASE, LIPASE, ALB, PREALBUMIN, GLUCOSE in the last 72 hours.    No results for input(s): RBC, HEMOGLOBIN, HEMATOCRIT, PLATELETCT, PROTHROMBTM, APTT, INR, IRON, FERRITIN, TOTIRONBC in the last 72 hours.              Assessment/Plan     Drug overdose, intentional  Assessment & Plan  Overdosed on risperdal in suicide attempt  Apparently  during acute psychotic episode brought on by marijuana use.  Psychiatry ([facility]) has evaluated patient, has initiated geodon  QTc has since normalized from 523 to 409, rechecked EKG after initiation of geodon showed QTc of 413  Continue 1:1  Will need inpatient psychiatry treatment  Now medically cleared for transfer, work with case management on transfer    Schizophrenia (CMS-[facility])  Assessment & Plan  Follows with outpatient psychiatry though is apparently noncompliant with her antipsychotics and admits to smoking marijuana which apparently exacerbates her psychosis  Psychiatry on board, appreciate the recommendations  PRN ativan and haldol on board.

## 2017-05-21 NOTE — CARE PLAN
Problem: Safety  Goal: Will remain free from injury  Outcome: PROGRESSING AS EXPECTED  Pt. Remains with 1:1 sitter, and does not currently verbalize wanting to hurt self.    Problem: Safety:  Goal: Will remain free from injury  Outcome: PROGRESSING AS EXPECTED  Pt. Remains with 1:1 sitter, and does not currently verbalize wanting to hurt self.    Problem: Psychosocial Needs:  Goal: Level of anxiety will decrease  Outcome: PROGRESSING AS EXPECTED  Pt. Has 1:1 sitter at all times.

## 2017-05-21 NOTE — DISCHARGE PLANNING
Received Legal hold extension. Referrals sent to [facility] at 1239. Legal hold and extension faxed separately.

## 2017-05-21 NOTE — CARE PLAN
Problem: Safety  Goal: Will remain free from injury  Outcome: PROGRESSING AS EXPECTED  Bed position low, call light within reach, treaded footwear, fall risk education, 1:1 sitter present    Problem: Infection  Goal: Will remain free from infection  Outcome: PROGRESSING AS EXPECTED  Hand hygiene and infection prevention education    Problem: Safety:  Goal: Will remain free from injury  Outcome: PROGRESSING AS EXPECTED  Bed position low, call light within reach, treaded footwear, fall risk education, 1:1 sitter present

## 2017-05-22 PROBLEM — F25.9 SCHIZOAFFECTIVE DISORDER (HCC): Status: ACTIVE | Noted: 2017-05-14

## 2017-05-22 PROCEDURE — 700111 HCHG RX REV CODE 636 W/ 250 OVERRIDE (IP): Performed by: STUDENT IN AN ORGANIZED HEALTH CARE EDUCATION/TRAINING PROGRAM

## 2017-05-22 PROCEDURE — A9270 NON-COVERED ITEM OR SERVICE: HCPCS | Performed by: PSYCHIATRY & NEUROLOGY

## 2017-05-22 PROCEDURE — 99232 SBSQ HOSP IP/OBS MODERATE 35: CPT | Mod: GC | Performed by: INTERNAL MEDICINE

## 2017-05-22 PROCEDURE — 770006 HCHG ROOM/CARE - MED/SURG/GYN SEMI*

## 2017-05-22 PROCEDURE — 700102 HCHG RX REV CODE 250 W/ 637 OVERRIDE(OP): Performed by: PSYCHIATRY & NEUROLOGY

## 2017-05-22 RX ORDER — ZIPRASIDONE HYDROCHLORIDE 40 MG/1
40 CAPSULE ORAL 2 TIMES DAILY
Status: DISCONTINUED | OUTPATIENT
Start: 2017-05-22 | End: 2017-05-24 | Stop reason: HOSPADM

## 2017-05-22 RX ADMIN — LAMOTRIGINE 200 MG: 100 TABLET ORAL at 08:55

## 2017-05-22 RX ADMIN — ZIPRASIDONE HCL 40 MG: 40 CAPSULE ORAL at 20:08

## 2017-05-22 RX ADMIN — ENOXAPARIN SODIUM 40 MG: 100 INJECTION SUBCUTANEOUS at 08:55

## 2017-05-22 RX ADMIN — ZIPRASIDONE HCL 20 MG: 20 CAPSULE ORAL at 08:55

## 2017-05-22 ASSESSMENT — ENCOUNTER SYMPTOMS
MYALGIAS: 0
SHORTNESS OF BREATH: 0
HEADACHES: 0
CHILLS: 0
DIZZINESS: 0
FOCAL WEAKNESS: 0
HALLUCINATIONS: 0
TINGLING: 0
INSOMNIA: 0
DEPRESSION: 0
NERVOUS/ANXIOUS: 1
FEVER: 0
SENSORY CHANGE: 0
NAUSEA: 0

## 2017-05-22 ASSESSMENT — PAIN SCALES - GENERAL
PAINLEVEL_OUTOF10: 0
PAINLEVEL_OUTOF10: 0

## 2017-05-22 NOTE — PROGRESS NOTES
"Pt had lengthy conversation with this CNA today. Pt verbalized talking to at least 4 different \"people\" through her \"brain power communication.\" Pt also stated \" she only bleeds when she has sperm inside of her,\" and \" the last she bleed was when her grandma's friend put sperm inside her.\" Pt was increasingly delusional, rambling, and even incoherent at times during this conversation, but otherwise was not agitated or threatening to this CNA.    "

## 2017-05-22 NOTE — CARE PLAN
Problem: Safety  Goal: Will remain free from injury  Outcome: PROGRESSING AS EXPECTED  Sitter 1:1, assisted as needed. Bed in low position, treaded socks on. Call light within reach, cont. Hourly rounding    Problem: Safety:  Goal: Will remain free from injury  Outcome: PROGRESSING AS EXPECTED  Sitter 1:1, assisted as needed. Bed in low position, treaded socks on. Call light within reach, cont. Hourly rounding    Problem: Psychosocial Needs:  Goal: Level of anxiety will decrease  Outcome: PROGRESSING AS EXPECTED  Prn ativan given per MAR, food offered

## 2017-05-22 NOTE — PROGRESS NOTES
"2130 pt became agitated with sitter and verbalized \"get the fuck out of my room or I'll punch you in the face\" and making other threats to sitter. Charge RN notified and security called. Charge RN, Kiana, talking with pt and gave pt ativan around 2140 pm. Pt calm at this moment and securit was called off  "

## 2017-05-22 NOTE — PROGRESS NOTES
"Around 11:10 pt agitated yelling at sitter and consistently verbalized \" I have the right to leave this hospital\" or \" I don't take any fucking medication without foods\". This RN explained to pt and offered the food that available on the unit, pt continued making a threat to this RN \"I will put the fucking paper to you throat if I don't have my food\". Charge RN notified. Food, chicken fettuccini, offered. Pt calm at this moment.  "

## 2017-05-22 NOTE — PROGRESS NOTES
Assumed care of pt at shift change, discussed POC. Pt refused assessment and refused answer questions. No IV. Pt up-self, sitter 1:1, treaded socks on, call light within reach, bed in low position.

## 2017-05-22 NOTE — PROGRESS NOTES
[facility] [place] [facility] [place]    Name Miranda Sykes       1985   Age/Sex 32 y.o. female   MRN [id number]   Code Status FULL     After 5PM or if no immediate response to page, please call for cross-coverage  Attending/Team: Dr. Kent Call [phone] to page   1st Call - Day Intern (R1):   Dr. Rogers 2nd Call - Day Sr. Resident (R2/R3):   Dr. Leija         Chief complaint/ reason for interval visit (Primary Diagnosis)   Suicide attempt by risperdal overdose    Interval Problem Daily Status Update    Active Problems:    Drug overdose, intentional POA: Unknown      Overview:       - Continue legal hold          Schizophrenia (CMS-[facility]) POA: Unknown      Overview: Overnight was aggressive. PRN sedatives used      Dr. De La O consulted. Pending new recs.   Resolved Problems:    Hypotension POA: Unknown      Overview: Stable and asymptomatic.       Ambulating without difficulty      Denies orthostasis      Review of Systems   Constitutional: Negative for fever and chills.   Respiratory: Negative for shortness of breath.    Cardiovascular: Negative for chest pain.   Gastrointestinal: Negative for nausea.   Genitourinary: Negative for dysuria.   Musculoskeletal: Negative for myalgias.   Skin: Negative for rash.   Neurological: Negative for dizziness, tingling, sensory change, focal weakness and headaches.   Psychiatric/Behavioral: Negative for depression, suicidal ideas and hallucinations. The patient is nervous/anxious. The patient does not have insomnia.        Consultants/Specialty  Psychiatry ([facility])    Disposition  Remain inpatient with 1:1 pending inpatient psychiatry transfer    Quality Measures  EKG reviewed, Medications reviewed, Radiology images reviewed and Labs reviewed  Duncan catheter: No Duncan      DVT Prophylaxis: Enoxaparin (Lovenox)    Ulcer prophylaxis: No    Assessed for rehab: Patient returned to prior level of function, rehabilitation not  indicated at this time          Physical Exam       Filed Vitals:    05/21/17 0802 05/21/17 1530 05/21/17 1950 05/22/17 0653   BP: 114/75 107/75 117/73 126/90   Pulse: 103 95 99 115   Temp: 36.9 °C (98.4 °F) 37.1 °C (98.8 °F) 37.3 °C (99.2 °F) 36.9 °C (98.4 °F)   Resp: 16 18 18 16   Height:       Weight:       SpO2: 96% 95% 97% 99%     Body mass index is 23.67 kg/(m^2).    Oxygen Therapy:  Pulse Oximetry: 99 %, O2 (LPM): 0, O2 Delivery: None (Room Air)    Physical Exam   Constitutional: She is oriented to person, place, and time and well-developed, well-nourished, and in no distress. No distress.   HENT:   Head: Normocephalic and atraumatic.   Neck: Normal range of motion. Neck supple.   Cardiovascular: Normal rate and regular rhythm.    Pulmonary/Chest: Effort normal and breath sounds normal. No respiratory distress. She has no wheezes.   Abdominal: Bowel sounds are normal. She exhibits no distension.   Musculoskeletal: Normal range of motion. She exhibits no edema.   Neurological: She is alert and oriented to person, place, and time. She has normal reflexes. GCS score is 15.   Skin: Skin is warm and dry. She is not diaphoretic.   Nursing note and vitals reviewed.    No current suicidal or homicidal ideation or plan    Lab Data Review:      5/14/2017  12:40 PM    No results for input(s): SODIUM, POTASSIUM, CHLORIDE, CO2, BUN, CREATININE, MAGNESIUM, PHOSPHORUS, CALCIUM in the last 72 hours.    No results for input(s): ALTSGPT, ASTSGOT, ALKPHOSPHAT, TBILIRUBIN, DBILIRUBIN, GAMMAGT, AMYLASE, LIPASE, ALB, PREALBUMIN, GLUCOSE in the last 72 hours.    No results for input(s): RBC, HEMOGLOBIN, HEMATOCRIT, PLATELETCT, PROTHROMBTM, APTT, INR, IRON, FERRITIN, TOTIRONBC in the last 72 hours.              Assessment/Plan     Drug overdose, intentional  Assessment & Plan  Overdosed on risperdal in suicide attempt  Apparently during acute psychotic episode brought on by marijuana use.  Psychiatry ([facility]) has evaluated  patient, has initiated geodon  QTc has since normalized from 523 to 409, rechecked EKG after initiation of geodon showed QTc of 413  Continue 1:1  Will need inpatient psychiatry treatment  Now medically cleared for transfer, work with case management on transfer    Schizophrenia (CMS-[facility])  Assessment & Plan  Follows with outpatient psychiatry though is apparently noncompliant with her antipsychotics and admits to smoking marijuana which apparently exacerbates her psychosis  Psychiatry on board, appreciate the recommendations  PRN ativan and haldol on board.

## 2017-05-23 PROCEDURE — 99231 SBSQ HOSP IP/OBS SF/LOW 25: CPT | Mod: GC | Performed by: INTERNAL MEDICINE

## 2017-05-23 PROCEDURE — A9270 NON-COVERED ITEM OR SERVICE: HCPCS | Performed by: HOSPITALIST

## 2017-05-23 PROCEDURE — 770006 HCHG ROOM/CARE - MED/SURG/GYN SEMI*

## 2017-05-23 PROCEDURE — 700102 HCHG RX REV CODE 250 W/ 637 OVERRIDE(OP): Performed by: STUDENT IN AN ORGANIZED HEALTH CARE EDUCATION/TRAINING PROGRAM

## 2017-05-23 PROCEDURE — 700111 HCHG RX REV CODE 636 W/ 250 OVERRIDE (IP): Performed by: STUDENT IN AN ORGANIZED HEALTH CARE EDUCATION/TRAINING PROGRAM

## 2017-05-23 PROCEDURE — 700102 HCHG RX REV CODE 250 W/ 637 OVERRIDE(OP): Performed by: PSYCHIATRY & NEUROLOGY

## 2017-05-23 PROCEDURE — A9270 NON-COVERED ITEM OR SERVICE: HCPCS | Performed by: PSYCHIATRY & NEUROLOGY

## 2017-05-23 PROCEDURE — 700102 HCHG RX REV CODE 250 W/ 637 OVERRIDE(OP): Performed by: HOSPITALIST

## 2017-05-23 PROCEDURE — A9270 NON-COVERED ITEM OR SERVICE: HCPCS | Performed by: STUDENT IN AN ORGANIZED HEALTH CARE EDUCATION/TRAINING PROGRAM

## 2017-05-23 RX ADMIN — LAMOTRIGINE 200 MG: 100 TABLET ORAL at 08:04

## 2017-05-23 RX ADMIN — NICOTINE 21 MG: 21 PATCH, EXTENDED RELEASE TRANSDERMAL at 06:02

## 2017-05-23 RX ADMIN — LORAZEPAM 1 MG: 1 TABLET ORAL at 08:05

## 2017-05-23 RX ADMIN — ZIPRASIDONE HCL 40 MG: 40 CAPSULE ORAL at 08:05

## 2017-05-23 RX ADMIN — STANDARDIZED SENNA CONCENTRATE AND DOCUSATE SODIUM 2 TABLET: 8.6; 5 TABLET, FILM COATED ORAL at 19:54

## 2017-05-23 RX ADMIN — STANDARDIZED SENNA CONCENTRATE AND DOCUSATE SODIUM 2 TABLET: 8.6; 5 TABLET, FILM COATED ORAL at 08:05

## 2017-05-23 RX ADMIN — ENOXAPARIN SODIUM 40 MG: 100 INJECTION SUBCUTANEOUS at 08:05

## 2017-05-23 RX ADMIN — ZIPRASIDONE HCL 40 MG: 40 CAPSULE ORAL at 19:54

## 2017-05-23 ASSESSMENT — ENCOUNTER SYMPTOMS
FEVER: 0
HEADACHES: 0
DEPRESSION: 0
DIZZINESS: 0
INSOMNIA: 0
HALLUCINATIONS: 0
FOCAL WEAKNESS: 0
CHILLS: 0
TINGLING: 0
NAUSEA: 0
SENSORY CHANGE: 0
NERVOUS/ANXIOUS: 1
MYALGIAS: 0
SHORTNESS OF BREATH: 0

## 2017-05-23 ASSESSMENT — PAIN SCALES - GENERAL
PAINLEVEL_OUTOF10: 0
PAINLEVEL_OUTOF10: 0

## 2017-05-23 NOTE — PROGRESS NOTES
Received report from day shict CNA sitter,assumed 1:1 sitter;patient eating her dinner at the moment.

## 2017-05-23 NOTE — PROGRESS NOTES
"Shortly after patient stood on top of the bed, she remained in bed for about 10 min before jumping up trying to walk out the door. I blocked her exit and she stated \" Don't fucking touch me.\" I did not touch patient at all. Simply blocked her way. She returned to bed on her own and laid back down.   "

## 2017-05-23 NOTE — PROGRESS NOTES
"Sitting at bedside with patient at this time. Patient constantly laughs at something but isn't sure what. Patient then suddenly stood up on top of bed looking as if she were about to jump off edge. Instantly assisted patient back down to the floor. She stated she was sleep walking, she then pretended to eat imaginary food. I was able to redirect patient back in bed when she then stated, \"I don't know if you're trying to help me but shut the fuck up.\" I stated I'm here to help her and not harm her. She stated \"okay\". Patient is back in bed at this time, eating her jello.    "

## 2017-05-23 NOTE — CARE PLAN
Problem: Safety  Goal: Will remain free from injury  Outcome: PROGRESSING AS EXPECTED  Pt remains on legal hold until 5/24/17   Intervention: Provide assistance with mobility    05/23/17 0938   OTHER   Assistance / Tolerance No assistance required   No injury during shift          Problem: Discharge Barriers/Planning  Goal: Patient’s continuum of care needs will be met  Outcome: PROGRESSING AS EXPECTED  Pt will be discharged to a facility once approved      Intervention: Assess potential discharge barriers on admission and throughout hospital stay    05/23/17 1019   OTHER   Does Admitting Nurse Feel This Could be a Complex Discharge? Yes           Problem: Safety:  Goal: Will remain free from injury  Outcome: PROGRESSING AS EXPECTED  Pt remains on legal hold until 5/24/17   Intervention: Provide assistance with mobility    05/23/17 0938   OTHER   Assistance / Tolerance No assistance required   No injury during shift

## 2017-05-23 NOTE — CARE PLAN
Problem: Safety  Goal: Will remain free from injury  Outcome: PROGRESSING AS EXPECTED  1:1 sitter at bedside. Evaluate suicidal ideation qshift/prn. Meds as ordered.     Problem: Bowel/Gastric:  Goal: Normal bowel function is maintained or improved  Outcome: PROGRESSING SLOWER THAN EXPECTED  Educated patient on use of senna, importance of activity and adequate diet.     Problem: Safety:  Goal: Will remain free from injury  Outcome: PROGRESSING AS EXPECTED  1:1 sitter at bedside. Evaluate suicidal ideation qshift/prn. Meds as ordered.

## 2017-05-23 NOTE — PROGRESS NOTES
Received pt from night RN. Medications given. Assessment refused per pt stated to not touch her. Pt very verbally aggressive and angry because she stated she was hungry. Pt provided with  Snacks until breakfast come. Pt alert and oriented with some inappropriate outburst. Pt remains on legal hold until 5/24/17. Pt has 1:1 sitter at bedside for pt safety. VSS. No other concerns

## 2017-05-23 NOTE — PROGRESS NOTES
Patient resting in bed. Alert and Oriented x4. Denies suicidal thoughts or plans at this time. Calm and pleasant. No complaints at this time.

## 2017-05-23 NOTE — PSYCHIATRY
PSYCHIATRIC FOLLOW UP:    Reason for Admission:   Legal hold status:     Psychiatric Supervising Attending:         HPI:       33 y/o woman with history of psychosis, originally presented with OD but now psychotic and manic. Was on risperdal; overdosed on it. Geodon 20mg isn't helping enough. She is refusing to change medications, however. She is manic, internally stimulated, laughing to herself. Indicated I would have to increase medication at this time and could try a higher dose of geodon. She hasn't actually been skipping any doses of medications.     Psychiatric Examination: observed phenomenon:  Vitals:  Filed Vitals:    05/22/17 1458 05/22/17 1900 05/23/17 0400 05/23/17 0700   BP: 108/74 113/80 98/66 113/83   Pulse: 93 87 65 68   Temp: 37 °C (98.6 °F) 37.1 °C (98.8 °F) 37.1 °C (98.7 °F) 36.7 °C (98.1 °F)   Resp: 18 17 18 18   Height:       Weight:       SpO2: 95% 96% 96% 97%       Musculoskeletal(abnormal movements, gait, etc): psyhomotor agitation, laughing to self   Appearance:grooming poor   Thoughts:disorganized   Speech: nl rate   Mood:    Elevated   Affect:    Labile   SI/HI:   Denies   Attention/Alertness:   Poor attention   Memory:    Poor   Orientation:      Fund of Knowledge:    decreased  Insight/Judgement into symptoms decreased   Neurological Testing:( ie clock, cube drawing, MMSE, MOCA,etc.)    Medical systems reviewed: (pain, new complaint, etc)         Denies pain  Denies n/v   All other systems reviewed and are negative.     Lab results/tests:   No results found for this or any previous visit (from the past 48 hour(s)).        Assessment:(acuity level)         Schizoaffective Disorder     Plan:  legal hold: on hold     Increasing geodon to 40mg po bid  Will continue to increase as tolerated   Continue lamictal, consider adding another mood stabilizer although she is refusing currently.   Will follow

## 2017-05-23 NOTE — PROGRESS NOTES
[facility] Internal Medicine Interval Note    Name Miranda Sykes       1985   Age/Sex 32 y.o. female   MRN [id number]   Code Status FULL     After 5PM or if no immediate response to page, please call for cross-coverage  Attending/Team: Dr. Kent Call [phone] to page   1st Call - Day Intern (R1):   Dr. Rogers 2nd Call - Day Sr. Resident (R2/R3):   Dr. Leija         Chief complaint/ reason for interval visit (Primary Diagnosis)   Suicide attempt by risperdal overdose      Was aggressive overnight. Geodon increased to 40 mg BID.     Interval Problem Daily Status Update    Active Problems:    Drug overdose, intentional POA: Unknown      Overview:       - Continue legal hold          Schizoaffective disorder (CMS-HCC) POA: Unknown      Overview: Overnight was aggressive      PRN sedative used.  Resolved Problems:    Hypotension POA: Unknown      Overview: Stable and asymptomatic.       Ambulating without difficulty      Denies orthostasis      Review of Systems   Constitutional: Negative for fever and chills.   Respiratory: Negative for shortness of breath.    Cardiovascular: Negative for chest pain.   Gastrointestinal: Negative for nausea.   Genitourinary: Negative for dysuria.   Musculoskeletal: Negative for myalgias.   Skin: Negative for rash.   Neurological: Negative for dizziness, tingling, sensory change, focal weakness and headaches.   Psychiatric/Behavioral: Negative for depression, suicidal ideas and hallucinations. The patient is nervous/anxious. The patient does not have insomnia.        Consultants/Specialty  Psychiatry ([facility])    Disposition  Remain inpatient with 1:1 pending inpatient psychiatry transfer    Quality Measures  EKG reviewed, Medications reviewed, Radiology images reviewed and Labs reviewed  Duncan catheter: No Duncan      DVT Prophylaxis: Enoxaparin (Lovenox)    Ulcer prophylaxis: No    Assessed for rehab: Patient returned to prior level of  function, rehabilitation not indicated at this time          Physical Exam       Filed Vitals:    05/22/17 1458 05/22/17 1900 05/23/17 0400 05/23/17 0700   BP: 108/74 113/80 98/66 113/83   Pulse: 93 87 65 68   Temp: 37 °C (98.6 °F) 37.1 °C (98.8 °F) 37.1 °C (98.7 °F) 36.7 °C (98.1 °F)   Resp: 18 17 18 18   Height:       Weight:       SpO2: 95% 96% 96% 97%     Body mass index is 23.67 kg/(m^2).    Oxygen Therapy:  Pulse Oximetry: 97 %, O2 (LPM): 0, O2 Delivery: None (Room Air)    Physical Exam   Constitutional: She is oriented to person, place, and time and well-developed, well-nourished, and in no distress. No distress.   HENT:   Head: Normocephalic and atraumatic.   Neck: Normal range of motion. Neck supple.   Cardiovascular: Normal rate and regular rhythm.    Pulmonary/Chest: Effort normal and breath sounds normal. No respiratory distress. She has no wheezes.   Abdominal: Bowel sounds are normal. She exhibits no distension.   Musculoskeletal: Normal range of motion. She exhibits no edema.   Neurological: She is alert and oriented to person, place, and time. She has normal reflexes. GCS score is 15.   Skin: Skin is warm and dry. She is not diaphoretic.   Nursing note and vitals reviewed.    No current suicidal or homicidal ideation or plan    Lab Data Review:      5/14/2017  12:40 PM    No results for input(s): SODIUM, POTASSIUM, CHLORIDE, CO2, BUN, CREATININE, MAGNESIUM, PHOSPHORUS, CALCIUM in the last 72 hours.    No results for input(s): ALTSGPT, ASTSGOT, ALKPHOSPHAT, TBILIRUBIN, DBILIRUBIN, GAMMAGT, AMYLASE, LIPASE, ALB, PREALBUMIN, GLUCOSE in the last 72 hours.    No results for input(s): RBC, HEMOGLOBIN, HEMATOCRIT, PLATELETCT, PROTHROMBTM, APTT, INR, IRON, FERRITIN, TOTIRONBC in the last 72 hours.              Assessment/Plan     Drug overdose, intentional  Assessment & Plan  Overdosed on risperdal in suicide attempt  Apparently during acute psychotic episode brought on by marijuana use.  Psychiatry  ([facility]) has evaluated patient, has initiated geodon  QTc has since normalized from 523 to 409, rechecked EKG after initiation of geodon showed QTc of 413  Continue 1:1  Will need inpatient psychiatry treatment  Now medically cleared for transfer, work with case management on transfer    Schizoaffective disorder (CMS-[facility])  Assessment & Plan  Follows with outpatient psychiatry though is apparently noncompliant with her antipsychotics and admits to smoking marijuana which apparently exacerbates her psychosis  Psychiatry on board, appreciate the recommendations. Continue geodon and lamictal   PRN ativan on board.

## 2017-05-24 VITALS
RESPIRATION RATE: 16 BRPM | BODY MASS INDEX: 23.64 KG/M2 | HEART RATE: 98 BPM | TEMPERATURE: 98 F | HEIGHT: 61 IN | DIASTOLIC BLOOD PRESSURE: 64 MMHG | WEIGHT: 125.22 LBS | SYSTOLIC BLOOD PRESSURE: 114 MMHG | OXYGEN SATURATION: 96 %

## 2017-05-24 PROCEDURE — 700102 HCHG RX REV CODE 250 W/ 637 OVERRIDE(OP): Performed by: HOSPITALIST

## 2017-05-24 PROCEDURE — 700111 HCHG RX REV CODE 636 W/ 250 OVERRIDE (IP): Performed by: STUDENT IN AN ORGANIZED HEALTH CARE EDUCATION/TRAINING PROGRAM

## 2017-05-24 PROCEDURE — 700102 HCHG RX REV CODE 250 W/ 637 OVERRIDE(OP): Performed by: PSYCHIATRY & NEUROLOGY

## 2017-05-24 PROCEDURE — 700102 HCHG RX REV CODE 250 W/ 637 OVERRIDE(OP): Performed by: STUDENT IN AN ORGANIZED HEALTH CARE EDUCATION/TRAINING PROGRAM

## 2017-05-24 PROCEDURE — A9270 NON-COVERED ITEM OR SERVICE: HCPCS | Performed by: STUDENT IN AN ORGANIZED HEALTH CARE EDUCATION/TRAINING PROGRAM

## 2017-05-24 PROCEDURE — 99239 HOSP IP/OBS DSCHRG MGMT >30: CPT | Mod: GC | Performed by: INTERNAL MEDICINE

## 2017-05-24 PROCEDURE — A9270 NON-COVERED ITEM OR SERVICE: HCPCS | Performed by: PSYCHIATRY & NEUROLOGY

## 2017-05-24 PROCEDURE — A9270 NON-COVERED ITEM OR SERVICE: HCPCS | Performed by: HOSPITALIST

## 2017-05-24 RX ORDER — LAMOTRIGINE 200 MG/1
200 TABLET ORAL DAILY
Qty: 30 TAB
Start: 2017-05-24

## 2017-05-24 RX ORDER — ZIPRASIDONE HYDROCHLORIDE 40 MG/1
40 CAPSULE ORAL 2 TIMES DAILY
Qty: 60 CAP
Start: 2017-05-24

## 2017-05-24 RX ADMIN — LORAZEPAM 1 MG: 1 TABLET ORAL at 08:40

## 2017-05-24 RX ADMIN — LAMOTRIGINE 200 MG: 100 TABLET ORAL at 08:40

## 2017-05-24 RX ADMIN — STANDARDIZED SENNA CONCENTRATE AND DOCUSATE SODIUM 2 TABLET: 8.6; 5 TABLET, FILM COATED ORAL at 08:40

## 2017-05-24 RX ADMIN — ZIPRASIDONE HCL 40 MG: 40 CAPSULE ORAL at 08:40

## 2017-05-24 RX ADMIN — NICOTINE 21 MG: 21 PATCH, EXTENDED RELEASE TRANSDERMAL at 05:47

## 2017-05-24 RX ADMIN — ENOXAPARIN SODIUM 40 MG: 100 INJECTION SUBCUTANEOUS at 08:40

## 2017-05-24 ASSESSMENT — ENCOUNTER SYMPTOMS
NERVOUS/ANXIOUS: 1
DIZZINESS: 0
MYALGIAS: 0
INSOMNIA: 0
FEVER: 0
TINGLING: 0
SENSORY CHANGE: 0
DEPRESSION: 0
FOCAL WEAKNESS: 0
NAUSEA: 0
CHILLS: 0
HEADACHES: 0
SHORTNESS OF BREATH: 0
HALLUCINATIONS: 0

## 2017-05-24 ASSESSMENT — PAIN SCALES - GENERAL: PAINLEVEL_OUTOF10: 0

## 2017-05-24 NOTE — PROGRESS NOTES
Assumed care of patient. Patient is alert and oriented, sitting up in bed. Very calm and pleasant at this time. Sitter at bedside. Bed low and locked. No complaints at this time.

## 2017-05-24 NOTE — CARE PLAN
Problem: Safety  Goal: Will remain free from injury  Outcome: PROGRESSING AS EXPECTED  Pt with 1:1 sitter at bedside. No injury during shift      Intervention: Provide assistance with mobility    05/24/17 1121   OTHER   Assistance / Tolerance No assistance required           Problem: Safety:  Goal: Will remain free from injury  Outcome: PROGRESSING AS EXPECTED  Pt with 1:1 sitter at bedside. No injury during shift      Intervention: Provide assistance with mobility    05/24/17 1121   OTHER   Assistance / Tolerance No assistance required           Problem: Psychosocial Needs:  Goal: Level of anxiety will decrease  Outcome: PROGRESSING AS EXPECTED    05/24/17 1121   OTHER   Patient Behaviors Agitated   Pt with agitation at times. Medication given

## 2017-05-24 NOTE — DISCHARGE SUMMARY
[facility] Internal Medicine Discharge Summary      Admit Date:  5/12/2017       Discharge Date:   5/24/17    Service:   [facility] Internal Medicine Purple Team  Attending Physician(s):   Dr. Kent       Senior Resident(s):   Dr. Leija  Cade Resident(s):   Dr. Rogers      Primary Diagnosis:   Suicide attempt via medication overdose  Secondary Diagnoses:                Active Problems:    Drug overdose, intentional POA: Unknown      Overview: Legal hold extended for a week. SW pursuing placement in  facility    Schizoaffective disorder (CMS-HCC) POA: Unknown      Overview: No aggression overnight  Resolved Problems:    Hypotension POA: Unknown      Overview: Stable and asymptomatic.       Ambulating without difficulty      Denies orthostasis      Hospital Summary (Brief Narrative):       31 yo female with PMHx of psychiatric disorder was admitted to the ICU initially for suicide attempt with risperidone. Patient was admitted to ICU initially as patient was hypotensive. Patient received iv fluids and BP was stable by morning. Initial QTc was prolonged, repeat EKG showed QTc has decreased and later stabilized. Poison control had been contacted.    Patient was placed on legal hold and psychiatry was consulted and eventually titrated her psych medications to start Geodon 40 mg BID and increase lamictal to 200 mg OD. She was asked to stop taking Risperidal. Patient was transferred to a psych facility for further care. At the time of discharge she was medically cleared.      Patient /Hospital Summary (Details -- Problem Oriented) :          Drug overdose, intentional  Assessment & Plan  Overdosed on risperdal in suicide attempt  Apparently during acute psychotic episode brought on by marijuana use.  Psychiatry ([facility]) has evaluated patient, has initiated geodon  QTc has since normalized from 523 to 409, rechecked EKG after initiation of geodon showed QTc of 413  Continue 1:1  Will need inpatient psychiatry  treatment  Now medically cleared for transfer, work with case management on transfer    Schizoaffective disorder (CMS-[facility])  Assessment & Plan  Follows with outpatient psychiatry though is apparently noncompliant with her antipsychotics and admits to smoking marijuana which apparently exacerbates her psychosis  Psychiatry on board, appreciate the recommendations. Continue geodon and lamictal   PRN ativan on board.     Hypotension, resolved as of 5/21/2017  Assessment & Plan  Transient, likely secondary to overdose with likely low baseline, asymptomatic  No further intervention necessary      Consultants:     Psychiatry - Dr. De La O    Procedures:        None    Imaging/ Testing:      Normal CXR 5/13/17    Discharge Medications:         Medication Reconciliation: Completed     Medication List      START taking these medications       Instructions    ziprasidone 40 MG Caps   Last time this was given:  40 mg on 5/24/2017  8:40 AM   Commonly known as:  GEODON    Take 1 Cap by mouth 2 Times a Day.   Dose:  40 mg         CHANGE how you take these medications       Instructions    lamotrigine 200 MG tablet   What changed:    - medication strength  - how much to take   Last time this was given:  200 mg on 5/24/2017  8:40 AM   Commonly known as:  LAMICTAL    Take 1 Tab by mouth every day.   Dose:  200 mg         STOP taking these medications          risperidone 3 MG Tabs   Commonly known as:  RISPERDAL                 Disposition:   DC to another facility     Diet:   Regular    Activity:   As tolerated    Instructions:         The patient was instructed to return to the ER in the event of worsening symptoms. I have counseled the patient on the importance of compliance and the patient has agreed to proceed with all medical recommendations and follow up plan indicated above.   The patient understands that all medications come with benefits and risks. Risks may include permanent injury or death and these risks can be  minimized with close reassessment and monitoring.        Primary Care Provider:    No primary care provider on file.    Discharge summary faxed to primary care provider:  Deferred  Copy of discharge summary given to the patient: Deferred    Follow up appointment details :      NA    Pending Studies:        None    Time spent on discharge day patient visit, preparing discharge paperwork and arranging for patient follow up.    Summary of follow up issues:   None    Discharge Time (Minutes) :    >45 mins      Condition on Discharge    Please see progress note from today

## 2017-05-24 NOTE — CARE PLAN
Problem: Bowel/Gastric:  Goal: Normal bowel function is maintained or improved  Outcome: PROGRESSING AS EXPECTED  Last bowel movement today, 5/24, per patient. Taking senna appropriately this shift. Educate on its purpose. Educate on adequate intake and activity.     Problem: Psychosocial Needs:  Goal: Level of anxiety will decrease  Outcome: PROGRESSING AS EXPECTED  Educate patient on poc. meds as ordered. Psych consult

## 2017-05-24 NOTE — DISCHARGE PLANNING
Transport arranged with [facility]. Patient will be leaving today @1800 via [facility] going to [place]. DAVID Mcgee notified.

## 2017-05-24 NOTE — DISCHARGE PLANNING
Medical Social Work    Referral: Legal hold court    Intervention: Pt presented for legal hold evaluation with . Recommendation per  is pt’s legal hold will be continued for one week (5/31/2017). Updated bedside RN and unit SW.     Plan: As above, pt’s legal hold has been extended for one week (5/31/2017). Unit SW to continue to pursue placement at inpatient  facility.

## 2017-05-24 NOTE — PROGRESS NOTES
[facility] Internal Medicine Interval Note    Name Miranda Sykes       1985   Age/Sex 32 y.o. female   MRN [id number]   Code Status FULL     After 5PM or if no immediate response to page, please call for cross-coverage  Attending/Team: Dr. Kent Call [phone] to page   1st Call - Day Intern (R1):   Dr. Rogers 2nd Call - Day Sr. Resident (R2/R3):   Dr. Leija         Chief complaint/ reason for interval visit (Primary Diagnosis)   Suicide attempt by risperdal overdose      Was aggressive overnight. Geodon increased to 40 mg BID.     Interval Problem Daily Status Update    Active Problems:    Drug overdose, intentional POA: Unknown      Overview: Legal hold extended for a week. SW pursuing placement in  facility    Schizoaffective disorder (CMS-HCC) POA: Unknown      Overview: No aggression overnight  Resolved Problems:    Hypotension POA: Unknown      Overview: Stable and asymptomatic.       Ambulating without difficulty      Denies orthostasis      Review of Systems   Constitutional: Negative for fever and chills.   Respiratory: Negative for shortness of breath.    Cardiovascular: Negative for chest pain.   Gastrointestinal: Negative for nausea.   Genitourinary: Negative for dysuria.   Musculoskeletal: Negative for myalgias.   Skin: Negative for rash.   Neurological: Negative for dizziness, tingling, sensory change, focal weakness and headaches.   Psychiatric/Behavioral: Negative for depression, suicidal ideas and hallucinations. The patient is nervous/anxious. The patient does not have insomnia.        Consultants/Specialty  Psychiatry ([facility])    Disposition  Remain inpatient with 1:1 pending inpatient psychiatry transfer    Quality Measures  EKG reviewed, Medications reviewed, Radiology images reviewed and Labs reviewed  Duncan catheter: No Duncan      DVT Prophylaxis: Enoxaparin (Lovenox)    Ulcer prophylaxis: No    Assessed for rehab: Patient returned to prior level  of function, rehabilitation not indicated at this time          Physical Exam       Filed Vitals:    05/23/17 0700 05/23/17 1457 05/24/17 0500 05/24/17 0648   BP: 113/83 103/62 101/67 92/66   Pulse: 68 93 56 66   Temp: 36.7 °C (98.1 °F) 36.9 °C (98.4 °F) 36.9 °C (98.4 °F) 36.6 °C (97.9 °F)   Resp: 18 18 16 18   Height:       Weight:       SpO2: 97% 98% 95% 94%     Body mass index is 23.67 kg/(m^2).    Oxygen Therapy:  Pulse Oximetry: 94 %, O2 (LPM): 0, O2 Delivery: None (Room Air)    Physical Exam   Constitutional: She is oriented to person, place, and time and well-developed, well-nourished, and in no distress. No distress.   HENT:   Head: Normocephalic and atraumatic.   Neck: Normal range of motion. Neck supple.   Cardiovascular: Normal rate and regular rhythm.    Pulmonary/Chest: Effort normal and breath sounds normal. No respiratory distress. She has no wheezes.   Abdominal: Bowel sounds are normal. She exhibits no distension.   Musculoskeletal: Normal range of motion. She exhibits no edema.   Neurological: She is alert and oriented to person, place, and time. She has normal reflexes. GCS score is 15.   Skin: Skin is warm and dry. She is not diaphoretic.   Nursing note and vitals reviewed.    No current suicidal or homicidal ideation or plan    Lab Data Review:      5/14/2017  12:40 PM    No results for input(s): SODIUM, POTASSIUM, CHLORIDE, CO2, BUN, CREATININE, MAGNESIUM, PHOSPHORUS, CALCIUM in the last 72 hours.    No results for input(s): ALTSGPT, ASTSGOT, ALKPHOSPHAT, TBILIRUBIN, DBILIRUBIN, GAMMAGT, AMYLASE, LIPASE, ALB, PREALBUMIN, GLUCOSE in the last 72 hours.    No results for input(s): RBC, HEMOGLOBIN, HEMATOCRIT, PLATELETCT, PROTHROMBTM, APTT, INR, IRON, FERRITIN, TOTIRONBC in the last 72 hours.              Assessment/Plan     Drug overdose, intentional  Assessment & Plan  Overdosed on risperdal in suicide attempt  Apparently during acute psychotic episode brought on by marijuana use.  Psychiatry  ([facility]) has evaluated patient, has initiated geodon  QTc has since normalized from 523 to 409, rechecked EKG after initiation of geodon showed QTc of 413  Continue 1:1  Will need inpatient psychiatry treatment  Now medically cleared for transfer, work with case management on transfer    Schizoaffective disorder (CMS-[facility])  Assessment & Plan  Follows with outpatient psychiatry though is apparently noncompliant with her antipsychotics and admits to smoking marijuana which apparently exacerbates her psychosis  Psychiatry on board, appreciate the recommendations. Continue geodon and lamictal   PRN ativan on board.

## 2017-05-24 NOTE — DISCHARGE PLANNING
Medical Social Work  PC from Charlene at [facility], they can accept the patient today.  I will need to set up [facility].    Paged [facility]:  PC from [facility]; told them patient has been accepted by [facility] and requested they submit a d/c summary, told they would submit it immediately.  Told them I am going to request [facility] transport patient at 6:00.    Faxed transport communication form and [facility] to [facility] for a tentative transport time of 6:00 to [facility].

## 2017-05-25 NOTE — DISCHARGE INSTRUCTIONS
Discharge Instructions    Discharged to other by ambulance with escort. Discharged via ambulance, hospital escort: Yes.  Special equipment needed: Not Applicable    Be sure to schedule a follow-up appointment with your primary care doctor or any specialists as instructed.     Discharge Plan:   Smoking Cessation Offered: Patient Counseled  Pneumococcal Vaccine Given - only chart on this line when given: Given (See MAR)  Influenza Vaccine Indication: Not indicated: Previously immunized this influenza season and > 8 years of age    I understand that a diet low in cholesterol, fat, and sodium is recommended for good health. Unless I have been given specific instructions below for another diet, I accept this instruction as my diet prescription.   Other diet: Regular      Special Instructions: None    · Is patient discharged on Warfarin / Coumadin?   No     · Is patient Post Blood Transfusion?  No  Suicidal Feelings: How to Help Yourself  Suicide is the taking of one's own life. If you feel as though life is getting too tough to handle and are thinking about suicide, get help right away. To get help:  · Call your local emergency services (911 in the [place]  · Call a suicide hotline to speak with a trained counselor who understands how you are feeling. The following is a list of suicide hotlines in the [place]. For a list of hotlines in [place], visit www.suicide.org/hotlines/international/unpcye-jfzriig-ftutjyfo.html.  ¨  7-509-439-TALK (1-299.716.8377).  ¨  5-561-TKQTJFK (1-343.533.1373).  ¨  1-604.571.1327. This is a hotline for [place] speakers.  ¨  1-602-590-4TTY (1-746.796.5979). This is a hotline for TTY users.  ¨  7-879-7-U-PB (1-181.659.4198). This is a hotline for lesbian, ortega, bisexual, transgender, or questioning youth.  · Contact a crisis center or a local suicide prevention center. To find a crisis center or suicide prevention center:  ¨ Call your local hospital, clinic, community service  organization, mental health center, social service provider, or health department. Ask for assistance in connecting to a crisis center.  ¨ Visit www.suicidepreventionlifeline.org/getinvolved/ for a list of crisis centers in the [place], or visit www.suicideprevention.ca/usnlmizz-olsah-tqdqhgp/find-a-crisis-centre for a list of centers in [place].  · Visit the following websites:  ¨  National Suicide Prevention Lifeline: www.suicidepreventionlifeline.org  ¨  Hopeline: www.hopeline."[employer]  ¨  American Foundation for Suicide Prevention: www.afsp.org  ¨  The [place] Project (for lesbian, ortega, bisexual, transgender, or questioning youth): www.thetrevorproject.org  HOW CAN I HELP MYSELF FEEL BETTER?  · Promise yourself that you will not do anything drastic when you have suicidal feelings. Remember, there is hope. Many people have gotten through suicidal thoughts and feelings, and you will, too. You may have gotten through them before, and this proves that you can get through them again.  · Let family, friends, teachers, or counselors know how you are feeling. Try not to isolate yourself from those who care about you. Remember, they will want to help you. Talk with someone every day, even if you do not feel sociable. Face-to-face conversation is best.  · Call a mental health professional and see one regularly.  · Visit your primary health care provider every year.  · Eat a well-balanced diet, and space your meals so you eat regularly.  · Get plenty of rest.  · Avoid alcohol and drugs, and remove them from your home. They will only make you feel worse.  · If you are thinking of taking a lot of medicine, give your medicine to someone who can give it to you one day at a time. If you are on antidepressants and are concerned you will overdose, let your health care provider know so he or she can give you safer medicines. Ask your mental health professional about the possible side effects of any medicines you are  taking.  · Remove weapons, poisons, knives, and anything else that could harm you from your home.  · Try to stick to routines. Follow a schedule every day. Put self-care on your schedule.  · Make a list of realistic goals, and cross them off when you achieve them. Accomplishments give a sense of worth.  · Wait until you are feeling better before doing the things you find difficult or unpleasant.  · Exercise if you are able. You will feel better if you exercise for even a half hour each day.  · Go out in the sun or into nature. This will help you recover from depression faster. If you have a favorite place to walk, go there.  · Do the things that have always given you pleasure. Play your favorite music, read a good book, paint a picture, play your favorite instrument, or do anything else that takes your mind off your depression if it is safe to do.  · Keep your living space well lit.  · When you are feeling well, write yourself a letter about tips and support that you can read when you are not feeling well.  · Remember that life's difficulties can be sorted out with help. Conditions can be treated. You can work on thoughts and strategies that serve you well.     This information is not intended to replace advice given to you by your health care provider. Make sure you discuss any questions you have with your health care provider.     Document Released: 06/23/2004 Document Revised: 01/08/2016 Document Reviewed: 04/14/2015  [employer] Interactive Patient Education ©2016 [employer]      Depression / Suicide Risk    As you are discharged from this [facility] facility, it is important to learn how to keep safe from harming yourself.    Recognize the warning signs:  · Abrupt changes in personality, positive or negative- including increase in energy   · Giving away possessions  · Change in eating patterns- significant weight changes-  positive or negative  · Change in sleeping patterns- unable to sleep or sleeping all  the time   · Unwillingness or inability to communicate  · Depression  · Unusual sadness, discouragement and loneliness  · Talk of wanting to die  · Neglect of personal appearance   · Rebelliousness- reckless behavior  · Withdrawal from people/activities they love  · Confusion- inability to concentrate     If you or a loved one observes any of these behaviors or has concerns about self-harm, here's what you can do:  · Talk about it- your feelings and reasons for harming yourself  · Remove any means that you might use to hurt yourself (examples: pills, rope, extension cords, firearm)  · Get professional help from the community (Mental Health, Substance Abuse, psychological counseling)  · Do not be alone:Call your Safe Contact- someone whom you trust who will be there for you.  · Call your local CRISIS HOTLINE 490-3270 or 491-273-7097  · Call your local Children's Mobile Crisis Response Team [place] (146) 057-6131 or www.FST21  · Call the toll free National Suicide Prevention Hotlines   · National Suicide Prevention Lifeline 154-818-JSXR (4521)  · National Hope Line Network 800-SUICIDE (972-3869)

## 2017-05-25 NOTE — DISCHARGE PLANNING
Medical Social Work    DAVID informed that [facility] is no longer on lock down and pt can transfer. DAVID spoke with BRANNON Yanes who arranged transport through [facility] and notified SW that pt can be transported at 1900. DAIVD notified NS of the above as to have pt ready for d/c. DAVID will complete packet and provide to RN for transfer.     Plan: Pt will be transported to [facility] via [facility] at 1900.

## 2017-05-25 NOTE — DISCHARGE PLANNING
Transport to [facility] has been scheduled for 1900 via [facility].  Kathy(DAVID) and [facility] notified.

## 2017-05-25 NOTE — PROGRESS NOTES
Seen note from Case coordinator and it states transport has been cancelled. Paged SW to get an update .    Update: [facility] is on lock down and not taking patients at this time. When lock down is up pt will be transported. SW will update when ready

## 2017-05-25 NOTE — PROGRESS NOTES
Discharge paperwork and belongings with [facility] staff. Pt VSS. Pt left via stretcher. No report need to be called per SW. No other concerns

## 2017-05-25 NOTE — DISCHARGE PLANNING
Transport to [facility] via [facility] at 1800 has been cancelled and placed on Will Call.